# Patient Record
Sex: MALE | Race: BLACK OR AFRICAN AMERICAN | NOT HISPANIC OR LATINO | Employment: FULL TIME | URBAN - METROPOLITAN AREA
[De-identification: names, ages, dates, MRNs, and addresses within clinical notes are randomized per-mention and may not be internally consistent; named-entity substitution may affect disease eponyms.]

---

## 2024-05-10 ENCOUNTER — OFFICE VISIT (OUTPATIENT)
Dept: PRIMARY CARE | Facility: CLINIC | Age: 54
End: 2024-05-10
Payer: COMMERCIAL

## 2024-05-10 VITALS
HEART RATE: 84 BPM | OXYGEN SATURATION: 97 % | HEIGHT: 74 IN | TEMPERATURE: 98.6 F | BODY MASS INDEX: 28.26 KG/M2 | WEIGHT: 220.2 LBS | SYSTOLIC BLOOD PRESSURE: 118 MMHG | DIASTOLIC BLOOD PRESSURE: 80 MMHG

## 2024-05-10 DIAGNOSIS — D69.6 THROMBOCYTOPENIA (CMS-HCC): ICD-10-CM

## 2024-05-10 DIAGNOSIS — Z00.00 ROUTINE HEALTH MAINTENANCE: Primary | ICD-10-CM

## 2024-05-10 DIAGNOSIS — D22.9 ATYPICAL MOLE: ICD-10-CM

## 2024-05-10 DIAGNOSIS — N52.9 ERECTILE DYSFUNCTION, UNSPECIFIED ERECTILE DYSFUNCTION TYPE: ICD-10-CM

## 2024-05-10 DIAGNOSIS — N40.1 BENIGN PROSTATIC HYPERPLASIA WITH WEAK URINARY STREAM: ICD-10-CM

## 2024-05-10 DIAGNOSIS — R39.12 BENIGN PROSTATIC HYPERPLASIA WITH WEAK URINARY STREAM: ICD-10-CM

## 2024-05-10 DIAGNOSIS — D72.819 CHRONIC LEUKOPENIA: ICD-10-CM

## 2024-05-10 DIAGNOSIS — Z12.11 COLON CANCER SCREENING: ICD-10-CM

## 2024-05-10 DIAGNOSIS — R80.9 PROTEINURIA, UNSPECIFIED TYPE: ICD-10-CM

## 2024-05-10 DIAGNOSIS — R53.83 OTHER FATIGUE: ICD-10-CM

## 2024-05-10 DIAGNOSIS — Z11.3 ROUTINE SCREENING FOR STI (SEXUALLY TRANSMITTED INFECTION): ICD-10-CM

## 2024-05-10 DIAGNOSIS — R17 ELEVATED BILIRUBIN: ICD-10-CM

## 2024-05-10 PROCEDURE — 99396 PREV VISIT EST AGE 40-64: CPT | Performed by: INTERNAL MEDICINE

## 2024-05-10 PROCEDURE — 1036F TOBACCO NON-USER: CPT | Performed by: INTERNAL MEDICINE

## 2024-05-10 RX ORDER — SILDENAFIL 100 MG/1
100 TABLET, FILM COATED ORAL DAILY PRN
Qty: 30 TABLET | Refills: 11 | Status: SHIPPED | OUTPATIENT
Start: 2024-05-10

## 2024-05-10 RX ORDER — SILDENAFIL 100 MG/1
100 TABLET, FILM COATED ORAL DAILY PRN
COMMUNITY
End: 2024-05-10 | Stop reason: SDUPTHER

## 2024-05-10 ASSESSMENT — ENCOUNTER SYMPTOMS
OCCASIONAL FEELINGS OF UNSTEADINESS: 0
DEPRESSION: 0
LOSS OF SENSATION IN FEET: 0

## 2024-05-10 NOTE — ASSESSMENT & PLAN NOTE
Mole present on left forearm for many years.  Heterogeneous coloration,normal border. Also with punctate hyperpigmentation on face.  To follow up with derm.

## 2024-05-10 NOTE — PROGRESS NOTES
"Chief Complaint   Patient presents with    [facility]         History Of Present Illness:    Tunde Nix is a 53 y.o. male presenting to establish care, update health maintenance.    Gets palpitations - concerned about heart.  Uncomfortable feeling in chest - usually better when exercising regularly.  Not exertional.  Feeling \"weak\".  Not having chest pain.  Am erections are rigid , not able to maintain rigid erection with partner.  Sildenafil works ,nl TT 501ng/dl in 2023.  (Tadalafil caused back pain)  Would like to update STI screening   Insomnia over time, using OTC melatonin - interrupted sleep, wakes after 4 hours   Energy and motivation have been down.    No change in bowel or bladder function.  Urine stream weaker over time.   Has hx of low WBC and low platelet.  Never met with hematology as recommended in past.  His brother passed from \"blood cancer\"    Asymptomatic but open to STI screening.      Stress Echo 7/11/17  Baseline Echo: The resting baseline ejection fraction was estimated at 60%.     Stress Echo: The ejection fraction is approximately 75% at peak stress.     Summary:   1. The resting ejection fraction was estimated at 60% with a peak exercise ejection fraction estimated at 75%.   2. No clinical, electrocardiographic or echocardiographic evidence for ischemia at a maximal workload.   3. The adequate level of stress was achieved.    Active Medical Problems:  Patient Active Problem List    Diagnosis Date Noted    Elevated bilirubin 05/10/2024    Thrombocytopenia (CMS-HCC) 05/10/2024    Chronic leukopenia 05/10/2024    Proteinuria 05/10/2024    Benign prostatic hyperplasia with weak urinary stream 05/10/2024    Routine health maintenance 05/10/2024    Other fatigue 05/10/2024    Atypical mole 05/10/2024    Erectile dysfunction 05/10/2024       Past Medical History:  Past Medical History:   Diagnosis Date    Chronic leukopenia     Elevated bilirubin     H/O cardiovascular stress test " "07/11/2017    Normal stress echo    Thrombocytopenia (CMS-HCC)        Past Surgical History:  Past Surgical History:   Procedure Laterality Date    WISDOM TOOTH EXTRACTION           Social History:  Social History     Tobacco Use    Smoking status: Never    Smokeless tobacco: Never   Vaping Use    Vaping status: Never Used   Substance Use Topics    Alcohol use: Never    Drug use: Never         Family History:  Family History   Problem Relation Name Age of Onset    Diabetes Mother          BKA    Hypertension Mother      No Known Problems Father      Leukemia Brother          \"blood cancer\"    Diabetes Brother      No Known Problems Daughter          school - [employer]        Allergies:  Tadalafil    Outpatient Medications:    Current Outpatient Medications:     sildenafil (Viagra) 100 mg tablet, Take 1 tablet (100 mg) by mouth once daily as needed for erectile dysfunction., Disp: 30 tablet, Rfl: 11    Review of Systems:  Pertinent positives in review of systems outlined above.  Complete ROS otherwise negative.           Last Recorded Vitals:  Vitals:    05/10/24 0918 05/10/24 1005   BP: 129/82 118/80   BP Location: Left arm    Patient Position: Sitting    BP Cuff Size: Large adult    Pulse: 84    Temp: 37 °C (98.6 °F)    SpO2: 97%    Weight: 99.9 kg (220 lb 3.2 oz)    Height: 1.88 m (6' 2\")    Body mass index is 28.27 kg/m².        Physical Exam  Vitals reviewed.   Constitutional:       Appearance: Normal appearance.   HENT:      Mouth/Throat:      Pharynx: Oropharynx is clear.   Eyes:      Extraocular Movements: Extraocular movements intact.      Conjunctiva/sclera: Conjunctivae normal.      Pupils: Pupils are equal, round, and reactive to light.   Neck:      Vascular: No carotid bruit.   Cardiovascular:      Rate and Rhythm: Normal rate and regular rhythm.   Pulmonary:      Effort: Pulmonary effort is normal.      Breath sounds: Normal breath sounds.   Abdominal:      General: Abdomen is flat. Bowel sounds are " normal.      Palpations: Abdomen is soft. There is no mass.      Tenderness: There is no abdominal tenderness. There is no right CVA tenderness or left CVA tenderness.   Musculoskeletal:      Cervical back: No tenderness.      Right lower leg: No edema.      Left lower leg: No edema.   Lymphadenopathy:      Cervical: No cervical adenopathy.   Neurological:      General: No focal deficit present.      Mental Status: He is alert and oriented to person, place, and time.   Psychiatric:         Mood and Affect: Mood normal.          Assessment/Plan   Problem List Items Addressed This Visit       Elevated bilirubin     Will repeat LFT and check bili frations         Relevant Orders    Comprehensive Metabolic Panel    Thrombocytopenia (CMS-HCC)     Will repeat CBC and check B12, iron studies, retic, SPEP, UPEP, Hep C and HIV and consider referral to hematology if cytopenias have progressed         Relevant Orders    Comprehensive Metabolic Panel    Chronic leukopenia     Repeat CBC, check B12, SPEP,UPEP,HIV,HepC and consider heme referral.         Relevant Orders    Comprehensive Metabolic Panel    Ferritin    Iron and TIBC    Transferrin    Vitamin B12    Reticulocytes    Serum Protein Electrophoresis    Urine Protein Electrophoresis    Proteinuria     Last UA with protein.  Will repeat UA now         Relevant Orders    Comprehensive Metabolic Panel    Urinalysis with Reflex Microscopic    Benign prostatic hyperplasia with weak urinary stream     Symptoms are mild.  PSA due now         Relevant Orders    Prostate Specific Antigen    Routine health maintenance - Primary     BP in a good range, check FBS and lipids.  PSA now,colonoscopy ordered.  Will update STI screen. Vaccines UTD.         Relevant Orders    Lipid Panel    Other fatigue     Will check CBC and TSH         Relevant Orders    TSH with reflex to Free T4 if abnormal    CBC and Auto Differential    Atypical mole     Mole present on left forearm for many years.   Heterogeneous coloration,normal border. Also with punctate hyperpigmentation on face.  To follow up with derm.         Relevant Orders    Referral to Dermatology    Erectile dysfunction     To continue with sildenafil as needed.  To consider daily tadalafil if LUTS worsen         Relevant Medications    sildenafil (Viagra) 100 mg tablet     Other Visit Diagnoses       Routine screening for STI (sexually transmitted infection)        Relevant Orders    C. Trachomatis / N. Gonorrhoeae, Amplified Detection    HIV 1/2 Antigen/Antibody Screen with Reflex to Confirmation    Syphilis Screen with Reflex    Hepatitis C Antibody    Colon cancer screening        Relevant Orders    Colonoscopy Screening; Average Risk Patient              Eduard Castelan MD

## 2024-05-10 NOTE — PATIENT INSTRUCTIONS
Fasting blood work and urinalysis now     Schedule colonoscopy     Sildenafil at [facility] or [facility]    Follow up with dermatology

## 2024-05-10 NOTE — ASSESSMENT & PLAN NOTE
BP in a good range, check FBS and lipids.  PSA now,colonoscopy ordered.  Will update STI screen. Vaccines UTD.

## 2024-05-10 NOTE — ASSESSMENT & PLAN NOTE
Will repeat CBC and check B12, iron studies, retic, SPEP, UPEP, Hep C and HIV and consider referral to hematology if cytopenias have progressed

## 2024-05-24 ENCOUNTER — LAB (OUTPATIENT)
Dept: LAB | Facility: LAB | Age: 54
End: 2024-05-24
Payer: COMMERCIAL

## 2024-05-24 DIAGNOSIS — Z00.00 ROUTINE HEALTH MAINTENANCE: ICD-10-CM

## 2024-05-24 DIAGNOSIS — R39.12 BENIGN PROSTATIC HYPERPLASIA WITH WEAK URINARY STREAM: ICD-10-CM

## 2024-05-24 DIAGNOSIS — R53.83 OTHER FATIGUE: ICD-10-CM

## 2024-05-24 DIAGNOSIS — Z11.3 ROUTINE SCREENING FOR STI (SEXUALLY TRANSMITTED INFECTION): ICD-10-CM

## 2024-05-24 DIAGNOSIS — R80.9 PROTEINURIA, UNSPECIFIED TYPE: ICD-10-CM

## 2024-05-24 DIAGNOSIS — D69.6 THROMBOCYTOPENIA (CMS-HCC): ICD-10-CM

## 2024-05-24 DIAGNOSIS — D72.819 CHRONIC LEUKOPENIA: ICD-10-CM

## 2024-05-24 DIAGNOSIS — R17 ELEVATED BILIRUBIN: ICD-10-CM

## 2024-05-24 DIAGNOSIS — N40.1 BENIGN PROSTATIC HYPERPLASIA WITH WEAK URINARY STREAM: ICD-10-CM

## 2024-05-24 LAB
ALBUMIN SERPL BCP-MCNC: 4.8 G/DL (ref 3.4–5)
ALP SERPL-CCNC: 53 U/L (ref 33–120)
ALT SERPL W P-5'-P-CCNC: 18 U/L (ref 10–52)
ANION GAP SERPL CALC-SCNC: 14 MMOL/L (ref 10–20)
APPEARANCE UR: CLEAR
AST SERPL W P-5'-P-CCNC: 16 U/L (ref 9–39)
BASOPHILS # BLD AUTO: 0.03 X10*3/UL (ref 0–0.1)
BASOPHILS NFR BLD AUTO: 1 %
BILIRUB SERPL-MCNC: 1.1 MG/DL (ref 0–1.2)
BILIRUB UR STRIP.AUTO-MCNC: NEGATIVE MG/DL
BUN SERPL-MCNC: 13 MG/DL (ref 6–23)
CALCIUM SERPL-MCNC: 9.5 MG/DL (ref 8.6–10.3)
CHLORIDE SERPL-SCNC: 103 MMOL/L (ref 98–107)
CHOLEST SERPL-MCNC: 194 MG/DL (ref 0–199)
CHOLESTEROL/HDL RATIO: 4.3
CO2 SERPL-SCNC: 27 MMOL/L (ref 21–32)
COLOR UR: NORMAL
CREAT SERPL-MCNC: 1.35 MG/DL (ref 0.5–1.3)
EGFRCR SERPLBLD CKD-EPI 2021: 63 ML/MIN/1.73M*2
EOSINOPHIL # BLD AUTO: 0.18 X10*3/UL (ref 0–0.7)
EOSINOPHIL NFR BLD AUTO: 5.8 %
ERYTHROCYTE [DISTWIDTH] IN BLOOD BY AUTOMATED COUNT: 12.9 % (ref 11.5–14.5)
FERRITIN SERPL-MCNC: 129 NG/ML (ref 20–300)
GLUCOSE SERPL-MCNC: 103 MG/DL (ref 74–99)
GLUCOSE UR STRIP.AUTO-MCNC: NORMAL MG/DL
HCT VFR BLD AUTO: 44 % (ref 41–52)
HCV AB SER QL: NONREACTIVE
HDLC SERPL-MCNC: 44.8 MG/DL
HGB BLD-MCNC: 15.1 G/DL (ref 13.5–17.5)
HGB RETIC QN: 33 PG (ref 28–38)
HIV 1+2 AB+HIV1 P24 AG SERPL QL IA: NONREACTIVE
IMM GRANULOCYTES # BLD AUTO: 0 X10*3/UL (ref 0–0.7)
IMM GRANULOCYTES NFR BLD AUTO: 0 % (ref 0–0.9)
IMMATURE RETIC FRACTION: 15.7 %
IRON SATN MFR SERPL: 17 % (ref 25–45)
IRON SERPL-MCNC: 60 UG/DL (ref 35–150)
KETONES UR STRIP.AUTO-MCNC: NEGATIVE MG/DL
LDLC SERPL CALC-MCNC: 131 MG/DL
LEUKOCYTE ESTERASE UR QL STRIP.AUTO: NEGATIVE
LYMPHOCYTES # BLD AUTO: 1.16 X10*3/UL (ref 1.2–4.8)
LYMPHOCYTES NFR BLD AUTO: 37.1 %
MCH RBC QN AUTO: 26.7 PG (ref 26–34)
MCHC RBC AUTO-ENTMCNC: 34.3 G/DL (ref 32–36)
MCV RBC AUTO: 78 FL (ref 80–100)
MONOCYTES # BLD AUTO: 0.22 X10*3/UL (ref 0.1–1)
MONOCYTES NFR BLD AUTO: 7 %
NEUTROPHILS # BLD AUTO: 1.54 X10*3/UL (ref 1.2–7.7)
NEUTROPHILS NFR BLD AUTO: 49.1 %
NITRITE UR QL STRIP.AUTO: NEGATIVE
NON HDL CHOLESTEROL: 149 MG/DL (ref 0–149)
NRBC BLD-RTO: 0 /100 WBCS (ref 0–0)
PH UR STRIP.AUTO: 5 [PH]
PLATELET # BLD AUTO: 138 X10*3/UL (ref 150–450)
POTASSIUM SERPL-SCNC: 4.1 MMOL/L (ref 3.5–5.3)
PROT SERPL-MCNC: 7.6 G/DL (ref 6.4–8.2)
PROT SERPL-MCNC: 8 G/DL (ref 6.4–8.2)
PROT UR STRIP.AUTO-MCNC: NORMAL MG/DL
PROT UR-ACNC: 26 MG/DL (ref 5–25)
PSA SERPL-MCNC: 0.7 NG/ML
RBC # BLD AUTO: 5.65 X10*6/UL (ref 4.5–5.9)
RBC # UR STRIP.AUTO: NEGATIVE /UL
RBC #/AREA URNS AUTO: NORMAL /HPF
RETICS #: 0.08 X10*6/UL (ref 0.02–0.12)
RETICS/RBC NFR AUTO: 1.4 % (ref 0.5–2)
SODIUM SERPL-SCNC: 140 MMOL/L (ref 136–145)
SP GR UR STRIP.AUTO: 1.02
TIBC SERPL-MCNC: 353 UG/DL (ref 240–445)
TRANSFERRIN SERPL-MCNC: 265 MG/DL (ref 200–360)
TREPONEMA PALLIDUM IGG+IGM AB [PRESENCE] IN SERUM OR PLASMA BY IMMUNOASSAY: NONREACTIVE
TRIGL SERPL-MCNC: 91 MG/DL (ref 0–149)
TSH SERPL-ACNC: 1.49 MIU/L (ref 0.44–3.98)
UIBC SERPL-MCNC: 293 UG/DL (ref 110–370)
UROBILINOGEN UR STRIP.AUTO-MCNC: NORMAL MG/DL
VIT B12 SERPL-MCNC: 354 PG/ML (ref 211–911)
VLDL: 18 MG/DL (ref 0–40)
WBC # BLD AUTO: 3.1 X10*3/UL (ref 4.4–11.3)
WBC #/AREA URNS AUTO: NORMAL /HPF

## 2024-05-24 PROCEDURE — 86780 TREPONEMA PALLIDUM: CPT

## 2024-05-24 PROCEDURE — 80053 COMPREHEN METABOLIC PANEL: CPT

## 2024-05-24 PROCEDURE — 84156 ASSAY OF PROTEIN URINE: CPT

## 2024-05-24 PROCEDURE — 84443 ASSAY THYROID STIM HORMONE: CPT

## 2024-05-24 PROCEDURE — 87389 HIV-1 AG W/HIV-1&-2 AB AG IA: CPT

## 2024-05-24 PROCEDURE — 84166 PROTEIN E-PHORESIS/URINE/CSF: CPT

## 2024-05-24 PROCEDURE — 82607 VITAMIN B-12: CPT

## 2024-05-24 PROCEDURE — 36415 COLL VENOUS BLD VENIPUNCTURE: CPT

## 2024-05-24 PROCEDURE — 84165 PROTEIN E-PHORESIS SERUM: CPT

## 2024-05-24 PROCEDURE — 84153 ASSAY OF PSA TOTAL: CPT

## 2024-05-24 PROCEDURE — 84466 ASSAY OF TRANSFERRIN: CPT

## 2024-05-24 PROCEDURE — 85025 COMPLETE CBC W/AUTO DIFF WBC: CPT

## 2024-05-24 PROCEDURE — 82728 ASSAY OF FERRITIN: CPT

## 2024-05-24 PROCEDURE — 83550 IRON BINDING TEST: CPT

## 2024-05-24 PROCEDURE — 84155 ASSAY OF PROTEIN SERUM: CPT

## 2024-05-24 PROCEDURE — 81001 URINALYSIS AUTO W/SCOPE: CPT

## 2024-05-24 PROCEDURE — 83540 ASSAY OF IRON: CPT

## 2024-05-24 PROCEDURE — 86803 HEPATITIS C AB TEST: CPT

## 2024-05-24 PROCEDURE — 80061 LIPID PANEL: CPT

## 2024-05-24 PROCEDURE — 85045 AUTOMATED RETICULOCYTE COUNT: CPT

## 2024-05-24 PROCEDURE — 87591 N.GONORRHOEAE DNA AMP PROB: CPT

## 2024-05-24 PROCEDURE — 87491 CHLMYD TRACH DNA AMP PROBE: CPT

## 2024-05-25 DIAGNOSIS — D75.9 CYTOPENIA: Primary | ICD-10-CM

## 2024-05-25 LAB
C TRACH RRNA SPEC QL NAA+PROBE: NEGATIVE
N GONORRHOEA DNA SPEC QL PROBE+SIG AMP: NEGATIVE

## 2024-05-28 LAB
ALBUMIN MFR UR ELPH: 80.4 %
ALPHA1 GLOB MFR UR ELPH: 2.4 %
ALPHA2 GLOB MFR UR ELPH: 5.6 %
B-GLOBULIN MFR UR ELPH: 7.9 %
GAMMA GLOB MFR UR ELPH: 3.7 %
PATH REVIEW-URINE PROTEIN ELECTROPHORESIS: NORMAL
URINE ELECTROPHORESIS COMMENT: NORMAL

## 2024-05-29 LAB
ALBUMIN: 5.1 G/DL (ref 3.4–5)
ALPHA 1 GLOBULIN: 0.3 G/DL (ref 0.2–0.6)
ALPHA 2 GLOBULIN: 0.5 G/DL (ref 0.4–1.1)
BETA GLOBULIN: 0.9 G/DL (ref 0.5–1.2)
GAMMA GLOBULIN: 1.2 G/DL (ref 0.5–1.4)
PATH REVIEW-SERUM PROTEIN ELECTROPHORESIS: ABNORMAL
PROTEIN ELECTROPHORESIS COMMENT: ABNORMAL

## 2024-06-24 PROBLEM — G47.00 INSOMNIA: Status: ACTIVE | Noted: 2024-06-24

## 2024-06-24 PROBLEM — N48.89 PEARLY PENILE PAPULES: Status: ACTIVE | Noted: 2024-06-24

## 2024-06-24 PROBLEM — K62.89 RECTAL PAIN: Status: ACTIVE | Noted: 2024-06-24

## 2024-06-24 PROBLEM — R00.2 HEART PALPITATIONS: Status: ACTIVE | Noted: 2024-06-24

## 2024-06-24 PROBLEM — Z77.098 EXPOSURE TO CHEMICAL INHALATION: Status: ACTIVE | Noted: 2024-06-24

## 2024-06-24 PROBLEM — E78.5 HYPERLIPIDEMIA: Status: ACTIVE | Noted: 2024-06-24

## 2024-06-24 PROBLEM — R05.9 COUGH: Status: ACTIVE | Noted: 2024-06-24

## 2024-06-24 PROBLEM — R10.9 ABDOMINAL PAIN: Status: ACTIVE | Noted: 2024-06-24

## 2024-06-24 PROBLEM — N46.9 MALE INFERTILITY: Status: ACTIVE | Noted: 2024-06-24

## 2024-06-24 PROBLEM — F52.4 PREMATURE EJACULATION: Status: ACTIVE | Noted: 2024-06-24

## 2024-06-24 PROBLEM — B37.42 CANDIDAL BALANITIS: Status: ACTIVE | Noted: 2024-06-24

## 2024-06-24 PROBLEM — N28.9 ACUTE RENAL INSUFFICIENCY: Status: ACTIVE | Noted: 2024-06-24

## 2024-06-24 PROBLEM — F52.0 DECREASED SEXUAL DESIRE: Status: ACTIVE | Noted: 2024-06-24

## 2024-06-24 PROBLEM — R07.89 ATYPICAL CHEST PAIN: Status: ACTIVE | Noted: 2024-06-24

## 2024-06-24 PROBLEM — K21.9 ESOPHAGEAL REFLUX: Status: ACTIVE | Noted: 2024-06-24

## 2024-06-24 RX ORDER — ERGOCALCIFEROL 1.25 MG/1
1.25 CAPSULE ORAL
COMMUNITY
Start: 2016-03-29

## 2024-06-24 RX ORDER — FLUOCINOLONE ACETONIDE 0.11 MG/ML
5 OIL AURICULAR (OTIC) 2 TIMES DAILY
COMMUNITY
Start: 2017-06-13

## 2024-06-24 RX ORDER — PAROXETINE HYDROCHLORIDE 20 MG/1
1 TABLET, FILM COATED ORAL DAILY
COMMUNITY
Start: 2016-05-04

## 2024-06-24 RX ORDER — TRIAMCINOLONE ACETONIDE 1 MG/G
1 CREAM TOPICAL 2 TIMES DAILY
COMMUNITY
Start: 2017-06-13

## 2024-06-24 RX ORDER — TRAZODONE HYDROCHLORIDE 50 MG/1
50-100 TABLET ORAL NIGHTLY PRN
COMMUNITY
Start: 2023-09-20

## 2024-06-24 RX ORDER — ROSUVASTATIN CALCIUM 5 MG/1
1 TABLET, COATED ORAL DAILY
COMMUNITY
Start: 2020-11-12

## 2024-07-01 ENCOUNTER — LAB (OUTPATIENT)
Dept: LAB | Facility: CLINIC | Age: 54
End: 2024-07-01
Payer: COMMERCIAL

## 2024-07-01 ENCOUNTER — OFFICE VISIT (OUTPATIENT)
Dept: HEMATOLOGY/ONCOLOGY | Facility: CLINIC | Age: 54
End: 2024-07-01
Payer: COMMERCIAL

## 2024-07-01 VITALS
SYSTOLIC BLOOD PRESSURE: 130 MMHG | TEMPERATURE: 98.2 F | DIASTOLIC BLOOD PRESSURE: 83 MMHG | RESPIRATION RATE: 16 BRPM | HEART RATE: 92 BPM | WEIGHT: 218.03 LBS | BODY MASS INDEX: 27.99 KG/M2 | OXYGEN SATURATION: 98 %

## 2024-07-01 DIAGNOSIS — D72.819 LEUKOPENIA, UNSPECIFIED TYPE: Primary | ICD-10-CM

## 2024-07-01 DIAGNOSIS — D72.810 LYMPHOPENIA: ICD-10-CM

## 2024-07-01 DIAGNOSIS — D72.819 LEUKOPENIA, UNSPECIFIED TYPE: ICD-10-CM

## 2024-07-01 DIAGNOSIS — D69.6 THROMBOCYTOPENIA (CMS-HCC): ICD-10-CM

## 2024-07-01 LAB
APTT PPP: 29 SECONDS (ref 27–38)
BASOPHILS # BLD AUTO: 0.01 X10*3/UL (ref 0–0.1)
BASOPHILS NFR BLD AUTO: 0.4 %
CMV IGG AVIDITY SERPL IA-RTO: REACTIVE %
CRP SERPL-MCNC: <0.1 MG/DL
EBV EA IGG SER QL: NEGATIVE
EBV NA AB SER QL: POSITIVE
EBV VCA IGG SER IA-ACNC: POSITIVE
EBV VCA IGM SER IA-ACNC: NEGATIVE
EOSINOPHIL # BLD AUTO: 0.06 X10*3/UL (ref 0–0.7)
EOSINOPHIL NFR BLD AUTO: 2.2 %
ERYTHROCYTE [DISTWIDTH] IN BLOOD BY AUTOMATED COUNT: 12.8 % (ref 11.5–14.5)
ERYTHROCYTE [SEDIMENTATION RATE] IN BLOOD BY WESTERGREN METHOD: 7 MM/H (ref 0–20)
FOLATE SERPL-MCNC: 16.7 NG/ML
HBV CORE IGM SER QL: NONREACTIVE
HBV SURFACE AG SERPL QL IA: NONREACTIVE
HCT VFR BLD AUTO: 43.7 % (ref 41–52)
HGB BLD-MCNC: 14.9 G/DL (ref 13.5–17.5)
IGA SERPL-MCNC: 183 MG/DL (ref 70–400)
IGG SERPL-MCNC: 1420 MG/DL (ref 700–1600)
IGM SERPL-MCNC: 69 MG/DL (ref 40–230)
IMM GRANULOCYTES # BLD AUTO: 0.01 X10*3/UL (ref 0–0.7)
IMM GRANULOCYTES NFR BLD AUTO: 0.4 % (ref 0–0.9)
INR PPP: 1 (ref 0.9–1.1)
LDH SERPL L TO P-CCNC: 135 U/L (ref 84–246)
LYMPHOCYTES # BLD AUTO: 0.91 X10*3/UL (ref 1.2–4.8)
LYMPHOCYTES NFR BLD AUTO: 33.1 %
MCH RBC QN AUTO: 26.6 PG (ref 26–34)
MCHC RBC AUTO-ENTMCNC: 34.1 G/DL (ref 32–36)
MCV RBC AUTO: 78 FL (ref 80–100)
MONOCYTES # BLD AUTO: 0.21 X10*3/UL (ref 0.1–1)
MONOCYTES NFR BLD AUTO: 7.6 %
NEUTROPHILS # BLD AUTO: 1.55 X10*3/UL (ref 1.2–7.7)
NEUTROPHILS NFR BLD AUTO: 56.3 %
NRBC BLD-RTO: ABNORMAL /100{WBCS}
PLATELET # BLD AUTO: 135 X10*3/UL (ref 150–450)
PROTHROMBIN TIME: 11.5 SECONDS (ref 9.8–12.8)
RBC # BLD AUTO: 5.61 X10*6/UL (ref 4.5–5.9)
RHEUMATOID FACT SER NEPH-ACNC: <10 IU/ML (ref 0–15)
TSH SERPL-ACNC: 1.56 MIU/L (ref 0.44–3.98)
WBC # BLD AUTO: 2.8 X10*3/UL (ref 4.4–11.3)

## 2024-07-01 PROCEDURE — 86431 RHEUMATOID FACTOR QUANT: CPT

## 2024-07-01 PROCEDURE — 82784 ASSAY IGA/IGD/IGG/IGM EACH: CPT

## 2024-07-01 PROCEDURE — 87340 HEPATITIS B SURFACE AG IA: CPT

## 2024-07-01 PROCEDURE — 81450 HL NEO GSAP 5-50DNA/DNA&RNA: CPT

## 2024-07-01 PROCEDURE — 36415 COLL VENOUS BLD VENIPUNCTURE: CPT

## 2024-07-01 PROCEDURE — 86665 EPSTEIN-BARR CAPSID VCA: CPT

## 2024-07-01 PROCEDURE — 84443 ASSAY THYROID STIM HORMONE: CPT

## 2024-07-01 PROCEDURE — 86140 C-REACTIVE PROTEIN: CPT

## 2024-07-01 PROCEDURE — 1036F TOBACCO NON-USER: CPT | Performed by: PHYSICIAN ASSISTANT

## 2024-07-01 PROCEDURE — 85730 THROMBOPLASTIN TIME PARTIAL: CPT

## 2024-07-01 PROCEDURE — 86023 IMMUNOGLOBULIN ASSAY: CPT

## 2024-07-01 PROCEDURE — 86664 EPSTEIN-BARR NUCLEAR ANTIGEN: CPT

## 2024-07-01 PROCEDURE — 84630 ASSAY OF ZINC: CPT

## 2024-07-01 PROCEDURE — 86645 CMV ANTIBODY IGM: CPT

## 2024-07-01 PROCEDURE — 86644 CMV ANTIBODY: CPT

## 2024-07-01 PROCEDURE — 83615 LACTATE (LD) (LDH) ENZYME: CPT

## 2024-07-01 PROCEDURE — 99214 OFFICE O/P EST MOD 30 MIN: CPT | Performed by: PHYSICIAN ASSISTANT

## 2024-07-01 PROCEDURE — 85652 RBC SED RATE AUTOMATED: CPT

## 2024-07-01 PROCEDURE — 86038 ANTINUCLEAR ANTIBODIES: CPT

## 2024-07-01 PROCEDURE — 85610 PROTHROMBIN TIME: CPT

## 2024-07-01 PROCEDURE — 86663 EPSTEIN-BARR ANTIBODY: CPT

## 2024-07-01 PROCEDURE — 88184 FLOWCYTOMETRY/ TC 1 MARKER: CPT | Mod: TC | Performed by: PHYSICIAN ASSISTANT

## 2024-07-01 PROCEDURE — 82525 ASSAY OF COPPER: CPT

## 2024-07-01 PROCEDURE — 99204 OFFICE O/P NEW MOD 45 MIN: CPT | Performed by: PHYSICIAN ASSISTANT

## 2024-07-01 PROCEDURE — 86705 HEP B CORE ANTIBODY IGM: CPT

## 2024-07-01 PROCEDURE — 85025 COMPLETE CBC W/AUTO DIFF WBC: CPT

## 2024-07-01 PROCEDURE — G0452 MOLECULAR PATHOLOGY INTERPR: HCPCS | Performed by: PATHOLOGY

## 2024-07-01 PROCEDURE — 82746 ASSAY OF FOLIC ACID SERUM: CPT

## 2024-07-01 ASSESSMENT — PAIN SCALES - GENERAL: PAINLEVEL: 4

## 2024-07-01 NOTE — PROGRESS NOTES
Patient ID: Tunde Nix is a 53 y.o. male.  Referring Physician: Eduard Castelan MD  [address] [place], [facility] [address]  [place] [address]  Primary Care Provider: Eduard Castelan MD  Visit Type: Initial Visit    Location: [facility]  Diagnosis/Reason:   Leukopenia  Thrombocytopenia      HPI:  Tunde Nix is a 53 y.o. male referred for consultation of leukopenia & thrombocytopenia.     Per review of records:  Leukocytopenia since 2018 ranging from 2.5 - 3.9  Lymphocytes low since 2018 (0.85-1.16)  Thrombocytopenia since 2018 ranging from 127 - 145  No anemia - MCV 78    Previous Hematological Background:  Hx of hematological disorders: No - Patient denies prior hematologic history  Hx of blood transfusions: No - Patient denies prior blood transfusion  Hx of iron supplementation: Yes - Oral supplementation - Denies adverse effect and reaction - Agent: in the past   Hx of B12 supplementation: No - Denies any prior supplementation  Hx of folate supplementation: No - Denies any prior supplementation    Working almost everyday so some tiredness, low feeling, returns to normal after rest. Headaches, chronic since childhood, takes Excedrin. Headaches when long periods without eating. Heartburn - takes TUMS. Low back pain with activities, radiates to butt. Rare palpitations. Itching outer ear - on drops.     Patient denies weight loss, abnormal bruising and bleeding, hematuria, blood in stool, dark/black stools, epistaxis, oral/gingival bleeding, lymphadenopathy, recurrent infections, recurrent fevers, night sweats, early satiety, abdominal pain, bone pain, chest pain, SOB, HORTON, dizziness, lightheadedness, PICA.    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    Male infertility 06/24/2024    Insomnia 06/24/2024    Hyperlipidemia 06/24/2024    Heart palpitations 06/24/2024    Exposure to chemical inhalation 06/24/2024    Esophageal reflux 06/24/2024    Decreased sexual desire 06/24/2024    Cough 06/24/2024    Candidal balanitis 06/24/2024    Atypical chest pain 06/24/2024    Acute renal insufficiency 06/24/2024    Abdominal pain 06/24/2024    Rectal pain 06/24/2024     Resolved Ambulatory Problems     Diagnosis Date Noted    No Resolved Ambulatory Problems     Past Medical History:   Diagnosis Date    H/O cardiovascular stress test 07/11/2017      PSHx:  Past Surgical History:   Procedure Laterality Date    WISDOM TOOTH EXTRACTION       FHx:  Family History   Problem Relation Name Age of Onset    Diabetes Mother          BKA    Hypertension Mother      No Known Problems Father      Leukemia Brother          \"blood cancer\"    Diabetes Brother      No Known Problems Daughter          school - [employer]      Mother: DM, HTN  Father: unknown   Siblings: Brother - unknown cancer   Children: 1 daughter healthy    Social Hx:  Tunde Nix    reports that he has never smoked. He has never used smokeless tobacco.  He  reports no history of alcohol use.  He  reports no history of drug use.  Social History     Socioeconomic History    Marital status:      Spouse name: None    Number of children: None    Years of education: None    Highest education level: None   Occupational History    Occupation: [employer]   Tobacco Use    Smoking status: Never    Smokeless tobacco: Never   Vaping Use    Vaping status: Never Used   Substance and Sexual Activity    Alcohol use: Never    Drug use: Never    Sexual activity: Yes   Other Topics Concern    None   Social History Narrative    Has gym membership, less " over past 6 months      Social Determinants of Health     Financial Resource Strain: Not on file   Food Insecurity: Not on file   Transportation Needs: Not on file   Physical Activity: Not on file   Stress: Not on file   Social Connections: Not on file   Intimate Partner Violence: Not on file   Housing Stability: Not on file      Living Situation: Alone   Occupation: [employer]  Marital Status: single  Alcohol Use: denies  Smoking: never smoker  Recreational Drug Use: denies  Special Diets: regular diet  Ethnicity: black - [place] - here for 15 years    Cancer Screenings:  Upper EGD: denies  Colonoscopy: Planned    Prostate/PSA screenings: 0.70 5/24/24   Lung cancer screenings: denies    Medications and allergies reviewed in EMR.    ROS:  10 point review of systems negative except as state in HPI.    Vitals & Statistics:  Objective   BSA: 2.27 meters squared  /83 (BP Location: Left arm, Patient Position: Sitting, BP Cuff Size: Large adult)   Pulse 92   Temp 36.8 °C (98.2 °F) (Core)   Resp 16   Wt 98.9 kg (218 lb 0.6 oz)   SpO2 98%   BMI 27.99 kg/m²     Physical Exam:  Physical Exam  Constitutional:       Appearance: Normal appearance. He is normal weight.   HENT:      Head: Normocephalic and atraumatic.      Nose: Nose normal.      Mouth/Throat:      Mouth: Mucous membranes are moist.      Pharynx: Oropharynx is clear.   Eyes:      Pupils: Pupils are equal, round, and reactive to light.   Cardiovascular:      Rate and Rhythm: Normal rate and regular rhythm.      Pulses: Normal pulses.      Heart sounds: Normal heart sounds.   Pulmonary:      Effort: Pulmonary effort is normal. No respiratory distress.      Breath sounds: Normal breath sounds.   Abdominal:      General: Abdomen is flat. Bowel sounds are normal. There is no distension.      Palpations: There is no mass.      Tenderness: There is no abdominal tenderness. There is no guarding.   Musculoskeletal:         General: Normal range of motion.       "Cervical back: Normal range of motion and neck supple.   Lymphadenopathy:      Cervical: No cervical adenopathy.   Skin:     General: Skin is warm and dry.      Capillary Refill: Capillary refill takes less than 2 seconds.      Comments: Chest and back pigment changes    Neurological:      General: No focal deficit present.      Mental Status: He is alert. Mental status is at baseline.   Psychiatric:         Mood and Affect: Mood normal.         Behavior: Behavior normal.         Thought Content: Thought content normal.         Judgment: Judgment normal.         Results:  Lab Results   Component Value Date    WBC 3.1 (L) 05/24/2024    NEUTROABS 1.54 05/24/2024    IGABSOL 0.00 05/24/2024    LYMPHSABS 1.16 (L) 05/24/2024    MONOSABS 0.22 05/24/2024    EOSABS 0.18 05/24/2024    BASOSABS 0.03 05/24/2024    RBC 5.65 05/24/2024    MCV 78 (L) 05/24/2024    MCHC 34.3 05/24/2024    HGB 15.1 05/24/2024    HCT 44.0 05/24/2024     (L) 05/24/2024     Lab Results   Component Value Date    RETICCTPCT 1.4 05/24/2024      Lab Results   Component Value Date    CREATININE 1.35 (H) 05/24/2024    BUN 13 05/24/2024    EGFR 63 05/24/2024     05/24/2024    K 4.1 05/24/2024     05/24/2024    CO2 27 05/24/2024      Lab Results   Component Value Date    ALT 18 05/24/2024    AST 16 05/24/2024    ALKPHOS 53 05/24/2024    BILITOT 1.1 05/24/2024      Lab Results   Component Value Date    TSH 1.49 05/24/2024     Lab Results   Component Value Date    TSH 1.49 05/24/2024     Lab Results   Component Value Date    IRON 60 05/24/2024    TIBC 353 05/24/2024    FERRITIN 129 05/24/2024      Lab Results   Component Value Date    ZOIBAMIT31 354 05/24/2024      No results found for: \"FOLATE\"  No results found for: \"ZULEMA\", \"RF\", \"SEDRATE\"   No results found for: \"CRP\"   No results found for: \"RUI\"  No results found for: \"LDH\"  No results found for: \"HAPTOGLOBIN\"  Lab Results   Component Value Date    SPEP Normal.    05/24/2024     No results " "found for: \"IGG\", \"IGM\", \"IGA\"  Lab Results   Component Value Date    HEPCAB Nonreactive 2024     Lab Results   Component Value Date    HIV1X2 Nonreactive 2024       Assessment:  Tunde Nix is a 53 y.o. male referred for consultation of leukopenia and thrombocytopenia.    Working almost everyday so some tiredness, low feeling, returns to normal after rest. Headaches, chronic since childhood, takes Excedrin. Headaches when long periods without eating. Heartburn - takes TUMS. Low back pain with activities, radiates to butt. Rare palpitations. Itching outer ear - on drops.     Physical exam unremarkable    I reviewed patient's chart including but not limited to labs, imaging, surgical/procedure notes, pathology, hospital notes, doctor's notes.    Relevant historical labs/imagin24  Creatinine: 1.35  Vit B12: 354  Iron studies: Normal ferritin, iron and TIBC, %Sat low at 17  TSH: WNL  SPEP: Normal   Urine Electrophoresis: No M protein, mild proteinuria   Hep C: Non-reactive   HIV: Negative    24 results:  Leukopenia at 2.8 - Lymphopenia at 0.91  MC/NC erythrocytes that are normal in count - H&H WNL - RDW WNL  Thrombocytopenia at 135K  Remaining results pending at time of writing    Plan:  Discussed possible etiologies of thrombocytopenia including: vitamin deficiency, enlarged spleen/liver, autoimmune disease, infection, inflammatory disorder, allergies, collection tube clumping, etc.   Discussed seeking medical attention if became sick d/t low wbc counts. To call us sooner for appt if starts getting B symptoms, recurrent infections or lumps.  Discussed possible etiologies of leukopenia including: vitamin deficiency, autoimmune disease, infection, inflammatory disorder, allergies, chronic benign leukopenia, malignancy, etc.   Will start hematological work-up today.    1) Leukopenia  2) Thrombocytopenia  Lab results pending - Will review when available and address adverse results as needed  RTC " in 2-4 weeks via in-person visit - F/U sooner if needed/urgent    I had an extensive discussion with the patient regarding the diagnosis and discussed the plan of therapy, including general considerations regarding side effects and outcomes. Pt understood and gave appropriate teach back about the plan of care. All questions were answered to the patient's satisfaction. The patient is instructed to contact us at any time if questions or problems arise. Thank you for the opportunity to participate in the care of this very pleasant patient.    Total time = 80 minutes. 50% or more of this time was spent in counseling and/or coordination of care including reviewing medical history/radiology/labs, examining patient, formulating outlined plan with team, and discussing plan with patient/family.    Sanju Raines PA-C

## 2024-07-02 LAB
CELL POPULATIONS: NORMAL
CMV IGM SERPL-ACNC: 10 AU/ML
DIAGNOSIS: NORMAL
FLOW DIFFERENTIAL: NORMAL
FLOW TEST ORDERED: NORMAL
LAB TEST METHOD: NORMAL
NUMBER OF CELLS COLLECTED: NORMAL PER TUBE
PATH REPORT.TOTAL CANCER: NORMAL
SIGNATURE COMMENT: NORMAL
SPECIMEN VIABILITY: NORMAL

## 2024-07-03 LAB
ANA SER QL HEP2 SUBST: NEGATIVE
COPPER SERPL-MCNC: 100.9 UG/DL (ref 70–140)
PLATELET ANTIBODY TARGET 1 IGG RESULT: NEGATIVE
PLATELET ANTIBODY TARGET 1 IGM RESULT: NEGATIVE
PLATELET ANTIBODY TARGET 2 IGG RESULT: NEGATIVE
PLATELET ANTIBODY TARGET 2 IGM RESULT: NEGATIVE
SCAN RESULT: NORMAL
ZINC SERPL-MCNC: 76.6 UG/DL (ref 60–120)

## 2024-07-09 LAB
ELECTRONICALLY SIGNED BY: NORMAL
MYELOID NGS RESULTS: NORMAL

## 2024-07-10 LAB — SCAN RESULT: NORMAL

## 2024-07-22 ENCOUNTER — OFFICE VISIT (OUTPATIENT)
Dept: HEMATOLOGY/ONCOLOGY | Facility: CLINIC | Age: 54
End: 2024-07-22
Payer: COMMERCIAL

## 2024-07-22 VITALS
TEMPERATURE: 97.5 F | SYSTOLIC BLOOD PRESSURE: 118 MMHG | HEART RATE: 103 BPM | WEIGHT: 218.7 LBS | DIASTOLIC BLOOD PRESSURE: 82 MMHG | BODY MASS INDEX: 28.08 KG/M2 | OXYGEN SATURATION: 97 % | RESPIRATION RATE: 18 BRPM

## 2024-07-22 DIAGNOSIS — D72.819 CHRONIC LEUKOPENIA: ICD-10-CM

## 2024-07-22 DIAGNOSIS — D69.3 IDIOPATHIC THROMBOCYTOPENIC PURPURA (MULTI): Primary | ICD-10-CM

## 2024-07-22 PROCEDURE — 99214 OFFICE O/P EST MOD 30 MIN: CPT | Performed by: PHYSICIAN ASSISTANT

## 2024-07-22 ASSESSMENT — PAIN SCALES - GENERAL: PAINLEVEL: 0-NO PAIN

## 2024-07-22 NOTE — PROGRESS NOTES
Patient ID: Tunde Nix is a 53 y.o. male.  Referring Physician: No referring provider defined for this encounter.  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    [facility] Hx:  7/1/24 - Initial Visit  Tunde Nix is a 53 y.o. male referred for consultation of leukopenia & thrombocytopenia.     Per review of records:  Leukocytopenia since 2018 ranging from 2.5 - 3.9  Lymphocytes low since 2018 (0.85-1.16)  Thrombocytopenia since 2018 ranging from 127 - 145  No anemia - MCV 78    Previous Hematological Background:  Hx of hematological disorders: No - Patient denies prior hematologic history  Hx of blood transfusions: No - Patient denies prior blood transfusion  Hx of iron supplementation: Yes - Oral supplementation - Denies adverse effect and reaction - Agent: in the past   Hx of B12 supplementation: No - Denies any prior supplementation  Hx of folate supplementation: No - Denies any prior supplementation    Working almost everyday so some tiredness, low feeling, returns to normal after rest. Headaches, chronic since childhood, takes Excedrin. Headaches when long periods without eating. Heartburn - takes TUMS. Low back pain with activities, radiates to butt. Rare palpitations. Itching outer ear - on drops.     Patient denies weight loss, abnormal bruising and  "bleeding, hematuria, blood in stool, dark/black stools, epistaxis, oral/gingival bleeding, lymphadenopathy, recurrent infections, recurrent fevers, night sweats, early satiety, abdominal pain, bone pain, chest pain, SOB, HORTON, dizziness, lightheadedness, PICA.    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    Male infertility 06/24/2024    Insomnia 06/24/2024    Hyperlipidemia 06/24/2024    Heart palpitations 06/24/2024    Exposure to chemical inhalation 06/24/2024    Esophageal reflux 06/24/2024    Decreased sexual desire 06/24/2024    Cough 06/24/2024    Candidal balanitis 06/24/2024    Atypical chest pain 06/24/2024    Acute renal insufficiency 06/24/2024    Abdominal pain 06/24/2024    Rectal pain 06/24/2024     Resolved Ambulatory Problems     Diagnosis Date Noted    No Resolved Ambulatory Problems     Past Medical History:   Diagnosis Date    H/O cardiovascular stress test 07/11/2017      PSHx:  Past Surgical History:   Procedure Laterality Date    WISDOM TOOTH EXTRACTION       FHx:  Family History   Problem Relation Name Age of Onset    Diabetes Mother          BKA    Hypertension Mother      No Known Problems Father      Leukemia Brother          \"blood cancer\"    Diabetes Brother      No Known Problems Daughter          school - [place]      Mother: DM, HTN  Father: unknown   Siblings: Brother - unknown cancer   Children: 1 daughter healthy    Social Hx:  Tunde Nix    reports that he has never smoked. He has never used smokeless tobacco.  He  reports no history of alcohol use.  He  reports no " history of drug use.  Social History     Socioeconomic History    Marital status:    Occupational History    Occupation: [employer]   Tobacco Use    Smoking status: Never    Smokeless tobacco: Never   Vaping Use    Vaping status: Never Used   Substance and Sexual Activity    Alcohol use: Never    Drug use: Never    Sexual activity: Yes   Social History Narrative    Has gym membership, less over past 6 months       Living Situation: Alone   Occupation: [employer]  Marital Status: single  Alcohol Use: denies  Smoking: never smoker  Recreational Drug Use: denies  Special Diets: regular diet  Ethnicity: black - Filipino - here for 15 years    Cancer Screenings:  Upper EGD: denies  Colonoscopy: Planned    Prostate/PSA screenings: 0.70 5/24/24   Lung cancer screenings: denies    Medications and allergies reviewed in EMR.    ROS:  10 point review of systems negative except as state in HPI.    Vitals & Statistics:  Objective   BSA: There is no height or weight on file to calculate BSA.  There were no vitals taken for this visit.    Physical Exam:  Physical Exam  Constitutional:       Appearance: Normal appearance. He is normal weight.   HENT:      Head: Normocephalic and atraumatic.      Nose: Nose normal.      Mouth/Throat:      Mouth: Mucous membranes are moist.      Pharynx: Oropharynx is clear.   Eyes:      Pupils: Pupils are equal, round, and reactive to light.   Cardiovascular:      Rate and Rhythm: Normal rate and regular rhythm.      Pulses: Normal pulses.      Heart sounds: Normal heart sounds.   Pulmonary:      Effort: Pulmonary effort is normal. No respiratory distress.      Breath sounds: Normal breath sounds.   Abdominal:      General: Abdomen is flat. Bowel sounds are normal. There is no distension.      Palpations: There is no mass.      Tenderness: There is no abdominal tenderness. There is no guarding.   Musculoskeletal:         General: Normal range of motion.      Cervical back: Normal range of motion and  "neck supple.   Lymphadenopathy:      Cervical: No cervical adenopathy.   Skin:     General: Skin is warm and dry.      Capillary Refill: Capillary refill takes less than 2 seconds.      Comments: Chest and back pigment changes    Neurological:      General: No focal deficit present.      Mental Status: He is alert. Mental status is at baseline.   Psychiatric:         Mood and Affect: Mood normal.         Behavior: Behavior normal.         Thought Content: Thought content normal.         Judgment: Judgment normal.         Results:  Lab Results   Component Value Date    WBC 2.8 (L) 07/01/2024    NEUTROABS 1.55 07/01/2024    IGABSOL 0.01 07/01/2024    LYMPHSABS 0.91 (L) 07/01/2024    MONOSABS 0.21 07/01/2024    EOSABS 0.06 07/01/2024    BASOSABS 0.01 07/01/2024    RBC 5.61 07/01/2024    MCV 78 (L) 07/01/2024    MCHC 34.1 07/01/2024    HGB 14.9 07/01/2024    HCT 43.7 07/01/2024     (L) 07/01/2024     Lab Results   Component Value Date    RETICCTPCT 1.4 05/24/2024      Lab Results   Component Value Date    CREATININE 1.35 (H) 05/24/2024    BUN 13 05/24/2024    EGFR 63 05/24/2024     05/24/2024    K 4.1 05/24/2024     05/24/2024    CO2 27 05/24/2024      Lab Results   Component Value Date    ALT 18 05/24/2024    AST 16 05/24/2024    ALKPHOS 53 05/24/2024    BILITOT 1.1 05/24/2024      Lab Results   Component Value Date    TSH 1.56 07/01/2024     Lab Results   Component Value Date    TSH 1.56 07/01/2024     Lab Results   Component Value Date    IRON 60 05/24/2024    TIBC 353 05/24/2024    FERRITIN 129 05/24/2024      Lab Results   Component Value Date    CWSNAYPZ51 354 05/24/2024      Lab Results   Component Value Date    FOLATE 16.7 07/01/2024     Lab Results   Component Value Date    ZULEMA Negative 07/01/2024    RF <10 07/01/2024    SEDRATE 7 07/01/2024      Lab Results   Component Value Date    CRP <0.10 07/01/2024      No results found for: \"RUI\"  Lab Results   Component Value Date     " "2024     No results found for: \"HAPTOGLOBIN\"  Lab Results   Component Value Date    SPEP Normal.    2024     Lab Results   Component Value Date    IGG 1,420 2024    IGM 69 2024     2024     Lab Results   Component Value Date    HEPBCIGM Nonreactive 2024    HEPBSAG Nonreactive 2024    HEPCAB Nonreactive 2024     Lab Results   Component Value Date    HIV1X2 Nonreactive 2024       Assessment:  Tunde Nix is a 53 y.o. 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 It also revealed patient is homozygous for paris-null promoter which has been shown to cause leukopenia/neutropenia in certain populations. With myeloid malignancy panel, flow cytometry, and ZULEMA negative the paris-null homozygosity is most likely the primary cause for patient's leukopenia. MPN/MPD still possibility however given that patient also asymptomatic, low suspicion. Patient's thrombocytopenia is most likely ITP given negative hematologic workup. 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 Pt understood and gave appropriate teach back about the plan of care. All questions were answered to the patient's satisfaction. The patient is instructed to contact us at any time if questions or problems arise. Thank you for the opportunity to participate in the care of this very pleasant patient.    Total time = 30 minutes. 50% or more of this time was spent in counseling and/or coordination of care including reviewing medical history/radiology/labs, examining patient, formulating outlined plan with team, and discussing plan with patient/family.    Sanju Raines PA-C

## 2024-08-14 ENCOUNTER — APPOINTMENT (OUTPATIENT)
Dept: PRIMARY CARE | Facility: CLINIC | Age: 54
End: 2024-08-14
Payer: COMMERCIAL

## 2024-11-08 ENCOUNTER — APPOINTMENT (OUTPATIENT)
Dept: PRIMARY CARE | Facility: CLINIC | Age: 54
End: 2024-11-08
Payer: COMMERCIAL

## 2024-11-08 VITALS
SYSTOLIC BLOOD PRESSURE: 121 MMHG | BODY MASS INDEX: 26.69 KG/M2 | OXYGEN SATURATION: 98 % | HEART RATE: 61 BPM | WEIGHT: 208 LBS | HEIGHT: 74 IN | DIASTOLIC BLOOD PRESSURE: 78 MMHG

## 2024-11-08 DIAGNOSIS — H93.A3 PULSATILE TINNITUS OF BOTH EARS: Primary | ICD-10-CM

## 2024-11-08 DIAGNOSIS — R09.89 LEFT CAROTID BRUIT: ICD-10-CM

## 2024-11-08 DIAGNOSIS — Z12.11 COLON CANCER SCREENING: ICD-10-CM

## 2024-11-08 DIAGNOSIS — L29.9 EAR ITCHING: ICD-10-CM

## 2024-11-08 PROCEDURE — 99213 OFFICE O/P EST LOW 20 MIN: CPT | Performed by: INTERNAL MEDICINE

## 2024-11-08 PROCEDURE — 3008F BODY MASS INDEX DOCD: CPT | Performed by: INTERNAL MEDICINE

## 2024-11-08 PROCEDURE — 1036F TOBACCO NON-USER: CPT | Performed by: INTERNAL MEDICINE

## 2024-11-08 RX ORDER — LANOLIN ALCOHOL/MO/W.PET/CERES
500 CREAM (GRAM) TOPICAL DAILY
COMMUNITY

## 2024-11-08 RX ORDER — FLUOCINOLONE ACETONIDE 0.11 MG/ML
5 OIL AURICULAR (OTIC) 2 TIMES DAILY
Qty: 20 ML | Refills: 1 | Status: SHIPPED | OUTPATIENT
Start: 2024-11-08

## 2024-11-08 ASSESSMENT — ENCOUNTER SYMPTOMS
LOSS OF SENSATION IN FEET: 0
OCCASIONAL FEELINGS OF UNSTEADINESS: 0
DEPRESSION: 0

## 2024-11-08 ASSESSMENT — PATIENT HEALTH QUESTIONNAIRE - PHQ9
SUM OF ALL RESPONSES TO PHQ9 QUESTIONS 1 AND 2: 0
1. LITTLE INTEREST OR PLEASURE IN DOING THINGS: NOT AT ALL
2. FEELING DOWN, DEPRESSED OR HOPELESS: NOT AT ALL

## 2024-11-08 NOTE — PROGRESS NOTES
"Chief Complaint:   Follow-up     History Of Present Illness:    Tunde Nix is a 53 y.o. male with active medical problems outlined below who presents to review labs and discuss pulsatile tinnitus.     Initial visit 5/10/24  Gets palpitations - concerned about heart.  Uncomfortable feeling in chest - usually better when exercising regularly.  Not exertional.  Feeling \"weak\".  Not having chest pain.  Am erections are rigid , not able to maintain rigid erection with partner.  Sildenafil works ,nl TT 501ng/dl in 2023.  (Tadalafil caused back pain)  Would like to update STI screening   Insomnia over time, using OTC melatonin - interrupted sleep, wakes after 4 hours   Energy and motivation have been down.    No change in bowel or bladder function.  Urine stream weaker over time.   Has hx of low WBC and low platelet.  Never met with hematology as recommended in past.  His brother passed from \"blood cancer\"    Asymptomatic but open to STI screening.    Stress Echo 7/11/17  Baseline Echo: The resting baseline ejection fraction was estimated at 60%.  Stress Echo: The ejection fraction is approximately 75% at peak stress.  Summary:   1. The resting ejection fraction was estimated at 60% with a peak exercise ejection fraction estimated at 75%.   2. No clinical, electrocardiographic or echocardiographic evidence for ischemia at a maximal workload.   3. The adequate level of stress was achieved.      INTERVAL HISTORY 11/8/24  Doing some dumbells and push ups at home - energy level OK, no exertional chest pain or shortness of breath.   Tunde has leukopenia and thrombocytopenia. Evaluated by heme and low WBC most consistent with  homozygous Paris-Null promotor.   Low plt likely ITP  Heme recommended follow up in 6mo.   Having pulsatile tinnitus has bene present for a while but increasing.  Occ headaches, no hearing loss, no vision changes.    Weight down 6-7 pounds          Patient Active Problem List   Diagnosis    Elevated " "bilirubin    Thrombocytopenia (CMS-HCC)    Chronic leukopenia    Proteinuria    Benign prostatic hyperplasia with weak urinary stream    Colon cancer screening    Other fatigue    Atypical mole    Erectile dysfunction    Premature ejaculation    Pearly penile papules    Male infertility    Insomnia    Hyperlipidemia    Heart palpitations    Exposure to chemical inhalation    Esophageal reflux    Decreased sexual desire    Cough    Candidal balanitis    Atypical chest pain    Acute renal insufficiency    Abdominal pain    Rectal pain    Left carotid bruit    Pulsatile tinnitus of both ears    Ear itching       Current Outpatient Medications:     ascorbic acid (Vitamin C) 500 mg ER capsule, Take 1 capsule (500 mg) by mouth once daily., Disp: , Rfl:     ergocalciferol (Vitamin D-2) 1.25 MG (58961 UT) capsule, Take 1 capsule (1,250 mcg) by mouth., Disp: , Rfl:     fluocinolone (DermOtic) 0.01 % ear drops, Administer 5 drops into each ear 2 times a day., Disp: 20 mL, Rfl: 1    sildenafil (Viagra) 100 mg tablet, Take 1 tablet (100 mg) by mouth once daily as needed for erectile dysfunction., Disp: 30 tablet, Rfl: 11    TURMERIC ORAL, Take by mouth., Disp: , Rfl:   Tadalafil  Social History     Tobacco Use    Smoking status: Never    Smokeless tobacco: Never   Vaping Use    Vaping status: Never Used   Substance Use Topics    Alcohol use: Never    Drug use: Never         All pertinent aspects of medical and surgical history were reviewed and updated in chart    Review of Systems   Pertinent positives in review of systems outlined above.  Complete ROS otherwise negative.        Last Recorded Vitals:  Patient Vitals for the past 24 hrs:   BP Pulse SpO2 Height Weight   11/08/24 0948 121/78 61 98 % 1.88 m (6' 2\") 94.3 kg (208 lb)          Physical Exam  HENT:      Mouth/Throat:      Pharynx: Oropharynx is clear.   Eyes:      Extraocular Movements: Extraocular movements intact.      Conjunctiva/sclera: Conjunctivae normal.      " Pupils: Pupils are equal, round, and reactive to light.   Neck:      Comments: Very soft left carotid bruit   Cardiovascular:      Pulses: Normal pulses.      Heart sounds: Normal heart sounds.   Pulmonary:      Effort: Pulmonary effort is normal.      Breath sounds: Normal breath sounds.   Musculoskeletal:      Right lower leg: No edema.      Left lower leg: No edema.   Lymphadenopathy:      Cervical: No cervical adenopathy.   Neurological:      General: No focal deficit present.             The 10-year ASCVD risk score (Kadeem DK, et al., 2019) is: 5.4%    Values used to calculate the score:      Age: 53 years      Sex: Male      Is Non- : Yes      Diabetic: No      Tobacco smoker: No      Systolic Blood Pressure: 121 mmHg      Is BP treated: No      HDL Cholesterol: 44.8 mg/dL      Total Cholesterol: 194 mg/dL      Assessment/Plan   Problem List Items Addressed This Visit       Colon cancer screening     Discussed options.  [facility] elects to do cologuard.          Relevant Orders    Cologuard® colon cancer screening    Left carotid bruit     Will arrange carotid doppler.          Relevant Orders    US carotid doppler left    Pulsatile tinnitus of both ears - Primary     Having bilateral symptoms. Will start with doppler US and consider MRA          Relevant Orders    US carotid doppler right    Ear itching     Refilled dermotic drops          Relevant Medications    fluocinolone (DermOtic) 0.01 % ear drops       A total of 20 minutes was spent reviewing the chart and recent testing and discussing plan of care.         Eduard Castelan MD

## 2024-11-13 ENCOUNTER — HOSPITAL ENCOUNTER (OUTPATIENT)
Dept: RADIOLOGY | Facility: CLINIC | Age: 54
Discharge: HOME | End: 2024-11-13
Payer: COMMERCIAL

## 2024-11-13 ENCOUNTER — APPOINTMENT (OUTPATIENT)
Dept: RADIOLOGY | Facility: CLINIC | Age: 54
End: 2024-11-13
Payer: COMMERCIAL

## 2024-11-13 DIAGNOSIS — H93.A3 PULSATILE TINNITUS OF BOTH EARS: ICD-10-CM

## 2024-11-13 PROCEDURE — 93880 EXTRACRANIAL BILAT STUDY: CPT | Performed by: RADIOLOGY

## 2024-11-13 PROCEDURE — 93880 EXTRACRANIAL BILAT STUDY: CPT

## 2024-11-16 ENCOUNTER — PATIENT MESSAGE (OUTPATIENT)
Dept: PRIMARY CARE | Facility: CLINIC | Age: 54
End: 2024-11-16
Payer: COMMERCIAL

## 2024-11-16 DIAGNOSIS — H93.A3 PULSATILE TINNITUS OF BOTH EARS: Primary | ICD-10-CM

## 2024-11-19 ENCOUNTER — PATIENT MESSAGE (OUTPATIENT)
Dept: CARE COORDINATION | Facility: CLINIC | Age: 54
End: 2024-11-19
Payer: COMMERCIAL

## 2025-01-20 ENCOUNTER — APPOINTMENT (OUTPATIENT)
Dept: HEMATOLOGY/ONCOLOGY | Facility: CLINIC | Age: 55
End: 2025-01-20
Payer: COMMERCIAL

## 2025-02-18 PROBLEM — K21.9 ESOPHAGEAL REFLUX: Status: RESOLVED | Noted: 2024-06-24 | Resolved: 2025-02-18

## 2025-02-18 PROBLEM — Z12.11 COLON CANCER SCREENING: Status: RESOLVED | Noted: 2024-05-10 | Resolved: 2025-02-18

## 2025-02-18 PROBLEM — B37.42 CANDIDAL BALANITIS: Status: RESOLVED | Noted: 2024-06-24 | Resolved: 2025-02-18

## 2025-02-18 PROBLEM — Z77.098 EXPOSURE TO CHEMICAL INHALATION: Status: RESOLVED | Noted: 2024-06-24 | Resolved: 2025-02-18

## 2025-02-18 PROBLEM — E78.5 HYPERLIPIDEMIA: Status: RESOLVED | Noted: 2024-06-24 | Resolved: 2025-02-18

## 2025-02-18 PROBLEM — R05.9 COUGH: Status: RESOLVED | Noted: 2024-06-24 | Resolved: 2025-02-18

## 2025-02-18 PROBLEM — R39.12 BENIGN PROSTATIC HYPERPLASIA WITH WEAK URINARY STREAM: Status: RESOLVED | Noted: 2024-05-10 | Resolved: 2025-02-18

## 2025-02-18 PROBLEM — N46.9 MALE INFERTILITY: Status: RESOLVED | Noted: 2024-06-24 | Resolved: 2025-02-18

## 2025-02-18 PROBLEM — N52.9 ERECTILE DYSFUNCTION: Status: RESOLVED | Noted: 2024-05-10 | Resolved: 2025-02-18

## 2025-02-18 PROBLEM — G47.00 INSOMNIA: Status: RESOLVED | Noted: 2024-06-24 | Resolved: 2025-02-18

## 2025-02-18 PROBLEM — R07.89 ATYPICAL CHEST PAIN: Status: RESOLVED | Noted: 2024-06-24 | Resolved: 2025-02-18

## 2025-02-18 PROBLEM — R17 ELEVATED BILIRUBIN: Status: RESOLVED | Noted: 2024-05-10 | Resolved: 2025-02-18

## 2025-02-18 PROBLEM — H60.543 DERMATITIS OF EAR CANAL, BILATERAL: Status: ACTIVE | Noted: 2024-11-08

## 2025-02-18 PROBLEM — N40.1 BENIGN PROSTATIC HYPERPLASIA WITH WEAK URINARY STREAM: Status: RESOLVED | Noted: 2024-05-10 | Resolved: 2025-02-18

## 2025-02-18 PROBLEM — R09.89 LEFT CAROTID BRUIT: Status: RESOLVED | Noted: 2024-11-08 | Resolved: 2025-02-18

## 2025-02-18 PROBLEM — F52.4 PREMATURE EJACULATION: Status: RESOLVED | Noted: 2024-06-24 | Resolved: 2025-02-18

## 2025-02-18 PROBLEM — R53.83 OTHER FATIGUE: Status: RESOLVED | Noted: 2024-05-10 | Resolved: 2025-02-18

## 2025-02-18 PROBLEM — N48.89 PEARLY PENILE PAPULES: Status: RESOLVED | Noted: 2024-06-24 | Resolved: 2025-02-18

## 2025-02-18 PROBLEM — F52.0 DECREASED SEXUAL DESIRE: Status: RESOLVED | Noted: 2024-06-24 | Resolved: 2025-02-18

## 2025-02-18 PROBLEM — R10.9 ABDOMINAL PAIN: Status: RESOLVED | Noted: 2024-06-24 | Resolved: 2025-02-18

## 2025-02-18 PROBLEM — D22.9 ATYPICAL MOLE: Status: RESOLVED | Noted: 2024-05-10 | Resolved: 2025-02-18

## 2025-02-18 PROBLEM — R00.2 HEART PALPITATIONS: Status: RESOLVED | Noted: 2024-06-24 | Resolved: 2025-02-18

## 2025-02-18 PROBLEM — N28.9 ACUTE RENAL INSUFFICIENCY: Status: RESOLVED | Noted: 2024-06-24 | Resolved: 2025-02-18

## 2025-02-18 PROBLEM — K62.89 RECTAL PAIN: Status: RESOLVED | Noted: 2024-06-24 | Resolved: 2025-02-18

## 2025-02-18 NOTE — ASSESSMENT & PLAN NOTE
-Carotid artery duplex ultrasound did not show significant obstruction.  -Patient was referred to ENT by previous PCP.  -See assessment and plan for palpitation.  -Will continue to monitor.

## 2025-02-18 NOTE — ASSESSMENT & PLAN NOTE
-Tunde has leukopenia and thrombocytopenia.    -Leukopenia  is likely 2/2 collins-null homozygosity  -Thrombocytopenia likely due to ITP.  -Levels have been stable.  -Will continue to monitor CBC and medication list.

## 2025-02-18 NOTE — PROGRESS NOTES
Subjective     Patient ID: Tunde Nix is a 54 y.o. male who presents today for establishment of care and other issues as listed below.    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      Initial PCP history 2/19/2025:  This is the first time I am meeting the patient.  Patient has a complaint of ongoing palpitations.  He is concerned that he could have a heart problem, specifically Plavix.  He had a cardiac stress test in 2017 which was normal.  He feels a sensation occasionally where his heart skips a beat.  He also feels pulsatile motion in his ears. Patient denies chest pain/pressure/tightness, shortness of breath, orthopnea, paroxysmal nocturnal dyspnea, lower limb edema, blurred vision, lightheadedness/dizziness, presyncope, syncope.      Tobacco/Alcohol/Drug Use:   Social History     Tobacco Use    Smoking status: Never    Smokeless tobacco: Never   Substance Use Topics    Alcohol use: Never       Required Screenings/Immunizations:   Health Maintenance Due   Topic Date Due    Colorectal Cancer Screening  Never done    MMR Vaccines (1 of 1 - Standard series) Never done    Hepatitis B Vaccines (1 of 3 - 19+ 3-dose series) Never done    DTaP/Tdap/Td Vaccines (1 - Tdap) Never done    Pneumococcal Vaccine (1 of 1 - PCV) Never done    Zoster Vaccines (1 of 2) Never done    Diabetes Screening  12/13/2023    Influenza Vaccine (1) Never done    COVID-19 Vaccine (4 - 2024-25 season) 09/01/2024       Problems to be addressed today in addition to Preventative Services: As stated in orders.     Review of Systems   Constitutional:  Negative for chills, fever and unexpected weight change.   HENT:  Positive for tinnitus (Pulsitile-Like blood flow.). Negative for rhinorrhea.    Eyes:  Negative for pain.   Respiratory:  Negative for chest tightness and shortness of breath.    Cardiovascular:  Positive for palpitations. Negative for chest pain.   Gastrointestinal:  Negative  "for abdominal pain.   Genitourinary:  Negative for hematuria.   Skin:  Negative for rash.   Neurological:  Negative for dizziness, syncope and light-headedness.   Psychiatric/Behavioral:  Negative for behavioral problems and suicidal ideas.        /78   Pulse 84   Temp 36.3 °C (97.4 °F)   Ht 1.88 m (6' 2\")   Wt 98.9 kg (218 lb)   SpO2 98%   BMI 27.99 kg/m²      Objective   Physical Exam  Vitals reviewed.   Constitutional:       General: He is not in acute distress.  HENT:      Head: Normocephalic.      Right Ear: External ear normal.      Left Ear: External ear normal.      Nose: No rhinorrhea.      Mouth/Throat:      Mouth: Mucous membranes are moist.   Eyes:      Conjunctiva/sclera: Conjunctivae normal.   Cardiovascular:      Rate and Rhythm: Normal rate and regular rhythm.      Heart sounds: No murmur heard.     No friction rub. No gallop.   Pulmonary:      Effort: No respiratory distress.      Breath sounds: No wheezing, rhonchi or rales.   Abdominal:      General: Bowel sounds are normal. There is no distension.      Palpations: Abdomen is soft.      Tenderness: There is no abdominal tenderness.   Musculoskeletal:      Cervical back: Neck supple.      Right lower leg: No edema.      Left lower leg: No edema.   Skin:     General: Skin is warm and dry.      Capillary Refill: Capillary refill takes less than 2 seconds.   Neurological:      Mental Status: He is alert.      Gait: Gait normal.           Labs:   Lab Results   Component Value Date    WBC 2.8 (L) 07/01/2024    HGB 14.9 07/01/2024    HCT 43.7 07/01/2024     (L) 07/01/2024    TSH 1.56 07/01/2024    PSA 0.70 05/24/2024    INR 1.0 07/01/2024     Lab Results   Component Value Date     05/24/2024    K 4.1 05/24/2024     05/24/2024    BUN 13 05/24/2024    CREATININE 1.35 (H) 05/24/2024    GLUCOSE 103 (H) 05/24/2024    CALCIUM 9.5 05/24/2024    PROT 7.6 05/24/2024    PROT 8.0 05/24/2024    BILITOT 1.1 05/24/2024    ALKPHOS 53 " "05/24/2024    AST 16 05/24/2024    ALT 18 05/24/2024     Lab Results   Component Value Date    CHOL 194 05/24/2024    CHOL 199 11/04/2020      Lab Results   Component Value Date    TRIG 91 05/24/2024    TRIG 79 11/04/2020      Lab Results   Component Value Date    HDL 44.8 05/24/2024    HDL 45.3 11/04/2020     Lab Results   Component Value Date    LDLCALC 131 (H) 05/24/2024      Lab Results   Component Value Date    VLDL 18 05/24/2024    VLDL 16 11/04/2020    No components found for: \"CHOLHDLRATI0\"    Imaging/Testing: Vascular US carotid artery duplex bilateral  Narrative: Interpreted By:  Chemo Nogueira,   STUDY:  [facility] US CAROTID ARTERY DUPLEX BILATERAL;  11/13/2024 10:41 am      INDICATION:  Signs/Symptoms:Pulsatile tinnitus.      ,H93.A3 Pulsatile tinnitus, bilateral      COMPARISON:  None.      ACCESSION NUMBER(S):  [id number]      ORDERING CLINICIAN:  SENIA BOLANOS      TECHNIQUE:  Vascular ultrasound of the extracranial carotid system was performed  bilaterally.  Gray scale, color Doppler and spectral Doppler waveform  analysis was performed.      FINDINGS:  RIGHT:  On the right  There is no evidence of atheromatous plaques in the  common carotid arteries, visualized portions of the internal carotid  arteries, and the proximal external carotid arteries. Doppler studies  demonstrate normal flow pattern without evidence of turbulent flow..  The peak systolic velocities are as follows:      RIGHT SIDE PEAK SYSTOLIC VELOCITY TABLE:  CCA 91.7.  ICA 81.0.  ECA 55.6.      The ratio of the peak systolic velocity of the right ICA/CCA is 0.88.      RIGHT VERTEBRAL ARTERY:  The right vertebral artery demonstrates proximal normal anterograde  flow      LEFT:  On the left  There is no evidence of atheromatous plaques in the  common carotid arteries, visualized portions of the internal carotid  arteries, and the proximal external carotid arteries. Doppler studies  demonstrate normal flow pattern without evidence of turbulent " flow..  The peak systolic velocities are as follows:      LEFT SIDE PEAK SYSTOLIC VELOCITY TABLE:  CCA 92.5,26.7.  ICA 76.1.  ECA 88.1.      The ratio of the peak systolic velocity of the left ICA/CCA is 0.82.      LEFT VERTEBRAL ARTERY:  The left vertebral artery demonstrates proximal normal anterograde  flow      Impression: Normal flow pattern without evidence of hemodynamically relevant  stenosis or atheromatous plaques in the visualized portions of the  carotid circulation as described above.      The velocity criteria are extrapolated from diameter data as defined  by the Society of Radiologists in Ultrasound Consensus Conference  Radiology 2003; 229;340-346.      MACRO:  None      Signed by: Chemo Nogueira 11/14/2024 6:27 AM  Dictation workstation:   [id number]      Problem List Items Addressed This Visit          Medium    Thrombocytopenia (CMS-HCC) - Primary    Current Assessment & Plan     -[place] has leukopenia and thrombocytopenia.    -Leukopenia  is likely 2/2 collins-null homozygosity  -Thrombocytopenia likely due to ITP.  -Levels have been stable.  -Will continue to monitor CBC and medication list.          Chronic leukopenia    Current Assessment & Plan     -[place] has leukopenia and thrombocytopenia.    -Leukopenia  is likely 2/2 collins-null homozygosity  -Thrombocytopenia likely due to ITP.  -Levels have been stable.  -Will continue to monitor CBC and medication list.          Pulsatile tinnitus of both ears    Current Assessment & Plan     -Carotid artery duplex ultrasound did not show significant obstruction.  -Patient was referred to ENT by previous PCP.  -See assessment and plan for palpitation.  -Will continue to monitor.         Palpitations    Current Assessment & Plan     -I reviewed outside hematology notes/laboratory regarding patient's blood count.  Hemoglobin levels are within normal limits.  No signs of anemia.  TSH was 4 with normal limits.  -Will obtain EKG, Holter monitor.  -Will  review results with patient and treatment options in May 2025.          Relevant Orders    ECG 12 Lead    Holter or Event Cardiac Monitor     Other Visit Diagnoses       Bilateral impacted cerumen        Relevant Medications    carbamide peroxide (Debrox) 6.5 % otic solution    High risk of cardiac event        Relevant Orders    CT cardiac scoring wo IV contrast    Screening for malignant neoplasm of colon        Relevant Orders    Colonoscopy Screening; Average Risk Patient              As part of today's Preventative Visit, an age and gender-appropriate history and physical was performed, as documented below. Counseling and anticipatory guidance were performed, and risk factor reduction interventions (including [place] Preventative Services Task Force recommended screening tests) were utilized/ordered as outlined in the above Assessment and Plan. All patient medications were reviewed, and refilled if necessary.    Patient and I discussed diet/nutrition, lifestyle modifications, safety, medication indications and side effects, and health goals.    current treatment plan is effective, no change in therapy, orders and follow up as documented in EMR, lab results reviewed with patient, repeat labs ordered prior to next appointment, reviewed compliance with lifestyle measures, reviewed diet, exercise and weight control, reviewed medications and side effects in detail     Follow-up in May 2025 for CPE, get blood work for CPE, follow-up on palpitations and review Holter monitor, hide EKG, CT cardiac coronary score with patient.  Will also follow-up on your itchiness.  Consider mometasone drops.      I have reviewed OARRS report, consistent with prescribed medication. I have considered risks of abuse, diversion, side effects and feel clinically benefit of medication outweighs risks at this time.          I spent 30 minutes in duration for this visit today. This time consisted of chart review, obtaining history,  and/or performing the exam as documented above as well as documenting the clinical information for the encounter in the electronic record, discussing treatment options, plans, and/or goals with patient, family, and/or caregiver, refilling medications, updating the electronic record, ordering medicines, lab work, imaging, referrals, and/or procedures as documented above and communicating with other [facility] professionals.

## 2025-02-19 ENCOUNTER — PHARMACY VISIT (OUTPATIENT)
Dept: PHARMACY | Facility: CLINIC | Age: 55
End: 2025-02-19
Payer: COMMERCIAL

## 2025-02-19 ENCOUNTER — APPOINTMENT (OUTPATIENT)
Dept: PRIMARY CARE | Facility: CLINIC | Age: 55
End: 2025-02-19
Payer: COMMERCIAL

## 2025-02-19 VITALS
TEMPERATURE: 97.4 F | SYSTOLIC BLOOD PRESSURE: 118 MMHG | HEIGHT: 74 IN | HEART RATE: 84 BPM | OXYGEN SATURATION: 98 % | BODY MASS INDEX: 27.98 KG/M2 | WEIGHT: 218 LBS | DIASTOLIC BLOOD PRESSURE: 78 MMHG

## 2025-02-19 DIAGNOSIS — H93.A3 PULSATILE TINNITUS OF BOTH EARS: ICD-10-CM

## 2025-02-19 DIAGNOSIS — H61.23 BILATERAL IMPACTED CERUMEN: ICD-10-CM

## 2025-02-19 DIAGNOSIS — Z91.89 HIGH RISK OF CARDIAC EVENT: ICD-10-CM

## 2025-02-19 DIAGNOSIS — D69.6 THROMBOCYTOPENIA (CMS-HCC): Primary | ICD-10-CM

## 2025-02-19 DIAGNOSIS — Z12.11 SCREENING FOR MALIGNANT NEOPLASM OF COLON: ICD-10-CM

## 2025-02-19 DIAGNOSIS — R00.2 PALPITATIONS: ICD-10-CM

## 2025-02-19 DIAGNOSIS — D72.819 CHRONIC LEUKOPENIA: ICD-10-CM

## 2025-02-19 PROCEDURE — 3008F BODY MASS INDEX DOCD: CPT | Performed by: STUDENT IN AN ORGANIZED HEALTH CARE EDUCATION/TRAINING PROGRAM

## 2025-02-19 PROCEDURE — 99214 OFFICE O/P EST MOD 30 MIN: CPT | Performed by: STUDENT IN AN ORGANIZED HEALTH CARE EDUCATION/TRAINING PROGRAM

## 2025-02-19 PROCEDURE — RXMED WILLOW AMBULATORY MEDICATION CHARGE

## 2025-02-19 RX ORDER — ASPIRIN 81 MG
5 TABLET, DELAYED RELEASE (ENTERIC COATED) ORAL 2 TIMES DAILY
Qty: 15 ML | Refills: 0 | Status: SHIPPED | OUTPATIENT
Start: 2025-02-19 | End: 2025-03-21

## 2025-02-19 ASSESSMENT — PATIENT HEALTH QUESTIONNAIRE - PHQ9
SUM OF ALL RESPONSES TO PHQ9 QUESTIONS 1 AND 2: 0
2. FEELING DOWN, DEPRESSED OR HOPELESS: NOT AT ALL
1. LITTLE INTEREST OR PLEASURE IN DOING THINGS: NOT AT ALL

## 2025-02-19 ASSESSMENT — COLUMBIA-SUICIDE SEVERITY RATING SCALE - C-SSRS
2. HAVE YOU ACTUALLY HAD ANY THOUGHTS OF KILLING YOURSELF?: NO
6. HAVE YOU EVER DONE ANYTHING, STARTED TO DO ANYTHING, OR PREPARED TO DO ANYTHING TO END YOUR LIFE?: NO
1. IN THE PAST MONTH, HAVE YOU WISHED YOU WERE DEAD OR WISHED YOU COULD GO TO SLEEP AND NOT WAKE UP?: NO

## 2025-02-19 ASSESSMENT — ENCOUNTER SYMPTOMS
ABDOMINAL PAIN: 0
EYE PAIN: 0
UNEXPECTED WEIGHT CHANGE: 0
CHEST TIGHTNESS: 0
FEVER: 0
SHORTNESS OF BREATH: 0
DIZZINESS: 0
PALPITATIONS: 1
RHINORRHEA: 0
LIGHT-HEADEDNESS: 0
HEMATURIA: 0
CHILLS: 0

## 2025-02-19 NOTE — ASSESSMENT & PLAN NOTE
-Patient has recurrent symptoms when he does not use the DermOtic drops.  I suspect that this is related to cerumen impaction.  -Will continue DermOtic drops.  -Will prescribe Debrox drops.

## 2025-02-19 NOTE — ASSESSMENT & PLAN NOTE
-I reviewed outside hematology notes/laboratory regarding patient's blood count.  Hemoglobin levels are within normal limits.  No signs of anemia.  TSH was 4 with normal limits.  -Will obtain EKG, Holter monitor.  -Will review results with patient and treatment options in May 2025.

## 2025-03-13 ENCOUNTER — HOSPITAL ENCOUNTER (OUTPATIENT)
Dept: CARDIOLOGY | Facility: HOSPITAL | Age: 55
Discharge: HOME | End: 2025-03-13
Payer: COMMERCIAL

## 2025-03-13 DIAGNOSIS — R00.2 PALPITATIONS: ICD-10-CM

## 2025-03-13 LAB
ATRIAL RATE: 77 BPM
P AXIS: 64 DEGREES
P OFFSET: 204 MS
P ONSET: 143 MS
PR INTERVAL: 170 MS
Q ONSET: 228 MS
QRS COUNT: 13 BEATS
QRS DURATION: 82 MS
QT INTERVAL: 360 MS
QTC CALCULATION(BAZETT): 407 MS
QTC FREDERICIA: 391 MS
R AXIS: 17 DEGREES
T AXIS: 15 DEGREES
T OFFSET: 408 MS
VENTRICULAR RATE: 77 BPM

## 2025-03-13 PROCEDURE — 93005 ELECTROCARDIOGRAM TRACING: CPT

## 2025-04-02 ENCOUNTER — HOSPITAL ENCOUNTER (OUTPATIENT)
Dept: RADIOLOGY | Facility: CLINIC | Age: 55
Discharge: HOME | End: 2025-04-02
Payer: COMMERCIAL

## 2025-04-02 DIAGNOSIS — Z91.89 HIGH RISK OF CARDIAC EVENT: ICD-10-CM

## 2025-04-02 PROCEDURE — 75571 CT HRT W/O DYE W/CA TEST: CPT

## 2025-04-10 ENCOUNTER — APPOINTMENT (OUTPATIENT)
Dept: CARDIOLOGY | Facility: HOSPITAL | Age: 55
End: 2025-04-10
Payer: COMMERCIAL

## 2025-05-20 PROBLEM — R80.9 PROTEINURIA: Status: RESOLVED | Noted: 2024-05-10 | Resolved: 2025-05-20

## 2025-05-20 ASSESSMENT — ENCOUNTER SYMPTOMS
UNEXPECTED WEIGHT CHANGE: 0
PALPITATIONS: 1
SHORTNESS OF BREATH: 0
LIGHT-HEADEDNESS: 0
CHEST TIGHTNESS: 0
HEMATURIA: 0
FEVER: 0
ABDOMINAL PAIN: 0
RHINORRHEA: 0
CHILLS: 0
DIZZINESS: 0
EYE PAIN: 0

## 2025-05-20 NOTE — PROGRESS NOTES
Subjective     Patient ID: Tunde Nix is a 54 y.o. male who presents today for establishment of care and other issues as listed below.    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      Initial PCP history 2/19/2025:  This is the first time I am meeting the patient.  Patient has a complaint of ongoing palpitations.  He is concerned that he could have a heart problem, specifically Plavix.  He had a cardiac stress test in 2017 which was normal.  He feels a sensation occasionally where his heart skips a beat.  He also feels pulsatile motion in his ears. Patient denies chest pain/pressure/tightness, shortness of breath, orthopnea, paroxysmal nocturnal dyspnea, lower limb edema, blurred vision, lightheadedness/dizziness, presyncope, syncope.    Interval History 5/21/2025:  Palpitations occur during the winter time. Then it goes away. Today is 5/21/2025, patient has not experienced palpitations in weeks. Therefore, he decided not to get against getting a holter monitor. Patient denies chest pain/pressure/tightness, shortness of breath, orthopnea, paroxysmal nocturnal dyspnea, lower limb edema, blurred vision, lightheadedness/dizziness, presyncope, syncope.        Tobacco/Alcohol/Drug Use:   Social History     Tobacco Use    Smoking status: Never    Smokeless tobacco: Never   Substance Use Topics    Alcohol use: Never       Required Screenings/Immunizations:   Health Maintenance Due   Topic Date Due    Colorectal Cancer Screening  Never done    MMR Vaccines (1 of 1 - Standard series) Never done    Hepatitis B Vaccines (1 of 3 - 19+ 3-dose series) Never done    DTaP/Tdap/Td Vaccines (1 - Tdap) Never done    Pneumococcal Vaccine (1 of 1 - PCV) Never done    Zoster Vaccines (1 of 2) Never done    COVID-19 Vaccine (4 - 2024-25 season) 09/01/2024       Problems to be addressed today in addition to Preventative Services: As stated in orders.     Review of Systems  "  Constitutional:  Negative for chills, fever and unexpected weight change.   HENT:  Positive for tinnitus (Pulsitile-Like blood flow.). Negative for rhinorrhea.    Eyes:  Negative for pain.   Respiratory:  Negative for chest tightness and shortness of breath.    Cardiovascular:  Positive for palpitations. Negative for chest pain.   Gastrointestinal:  Negative for abdominal pain.   Genitourinary:  Negative for hematuria.   Skin:  Negative for rash.   Neurological:  Negative for dizziness, syncope and light-headedness.   Psychiatric/Behavioral:  Negative for behavioral problems and suicidal ideas.        /80 (BP Location: Right arm, Patient Position: Sitting, BP Cuff Size: Large adult)   Pulse 83   Temp 36.3 °C (97.4 °F) (Temporal)   Ht 1.88 m (6' 2\")   Wt 101 kg (222 lb)   SpO2 96%   BMI 28.50 kg/m²      Objective   Physical Exam  Vitals reviewed.   Constitutional:       General: He is not in acute distress.  HENT:      Head: Normocephalic.      Right Ear: External ear normal.      Left Ear: External ear normal.      Nose: No rhinorrhea.      Mouth/Throat:      Mouth: Mucous membranes are moist.   Eyes:      Conjunctiva/sclera: Conjunctivae normal.   Cardiovascular:      Rate and Rhythm: Normal rate and regular rhythm.      Heart sounds: No murmur heard.     No friction rub. No gallop.   Pulmonary:      Effort: No respiratory distress.      Breath sounds: No wheezing, rhonchi or rales.   Abdominal:      General: Bowel sounds are normal. There is no distension.      Palpations: Abdomen is soft.      Tenderness: There is no abdominal tenderness.   Musculoskeletal:      Cervical back: Neck supple.      Right lower leg: No edema.      Left lower leg: No edema.   Skin:     General: Skin is warm and dry.      Capillary Refill: Capillary refill takes less than 2 seconds.   Neurological:      Mental Status: He is alert.      Gait: Gait normal.           Labs:   Lab Results   Component Value Date    WBC 3.3 (L) " "05/21/2025    HGB 14.8 05/21/2025    HCT 45.4 05/21/2025     05/21/2025    TSH 1.56 07/01/2024    PSA 1.92 05/21/2025    INR 1.0 07/01/2024     Lab Results   Component Value Date     05/21/2025    K 4.3 05/21/2025     05/21/2025    BUN 14 05/21/2025    CREATININE 1.32 (H) 05/21/2025    GLUCOSE 183 (H) 05/21/2025    CALCIUM 9.8 05/21/2025    PROT 7.8 05/21/2025    BILITOT 1.1 05/21/2025    ALKPHOS 56 05/21/2025    AST 29 05/21/2025    ALT 30 05/21/2025     Lab Results   Component Value Date    CHOL 224 (H) 05/21/2025    CHOL 194 05/24/2024    CHOL 199 11/04/2020      Lab Results   Component Value Date    TRIG 161 (H) 05/21/2025    TRIG 91 05/24/2024    TRIG 79 11/04/2020      Lab Results   Component Value Date    HDL 44 05/21/2025    HDL 44.8 05/24/2024    HDL 45.3 11/04/2020     Lab Results   Component Value Date    LDLCALC 150 (H) 05/21/2025    LDLCALC 131 (H) 05/24/2024      Lab Results   Component Value Date    VLDL 18 05/24/2024    VLDL 16 11/04/2020    No components found for: \"CHOLHDLRATI0\"    Imaging/Testing: CT cardiac scoring wo IV contrast  Narrative: Interpreted By:  [place] Mejia,   STUDY:  CT CARDIAC SCORING WO IV CONTRAST; 4/2/2025 6:10 pm      INDICATION:  Signs/Symptoms:Ongoing Palpitations.      COMPARISON:  None.      ACCESSION NUMBER(S):  [id number]      ORDERING CLINICIAN:  HARRIET DAUGHERTY      TECHNIQUE:  Using prospective ECG gating, CT scan of the coronary arteries was  performed without intravenous contrast. Coronary calcium scoring  was  performed according to the method of Agatston.      FINDINGS:  The score and distribution of calcium in the coronary arteries is as  follows:      LM 0,  LAD 19.07,  LCx 0,  RCA 78.07,      Total 97.14      The visualized ascending thoracic aorta measures 3.0 cm in diameter.  The heart is normal in size. No pericardial effusion is present.      No gross evidence of mediastinal or hilar lymphadenopathy or masses  is identified. The " visualized segments of the lungs are normally  expanded.      The main pulmonary artery, right and left pulmonary artery are normal  in size.      The visualized subdiaphragmatic structures appear intact.      Impression: 1. Coronary artery calcium score of 97.14*.      *Coronary Artery  Agatston score      Score  risk  Very low 1-99  Mildly increased 100-299  Moderately increased >300  Moderate to severely increased >800      Galilea et al. JCCT 2016 (http://dx.doi.org/10.1016/j.jcct.2016.11.003)      [place] 10-Year CHD Risk with Coronary Artery Calcification can be  calcuate using link below  [email or web address]  Breonna lew al. JACC 2015 (http://dx.doi.org/10.1016/j.j  acc.2015.08.035)      Signed by: [place] Mejia 4/4/2025 8:39 AM  Dictation workstation:   [id number]      Problem List Items Addressed This Visit          Medium    Thrombocytopenia    Current Assessment & Plan   -[place] has leukopenia and thrombocytopenia.    -Leukopenia  is likely 2/2 collins-null homozygosity  -Thrombocytopenia likely due to ITP.  -Levels have been stable.  -Will continue to monitor CBC and medication list.          Chronic leukopenia    Current Assessment & Plan   -[place] has leukopenia and thrombocytopenia.    -Leukopenia  is likely 2/2 collins-null homozygosity  -Thrombocytopenia likely due to ITP.  -Levels have been stable.  -Will continue to monitor CBC and medication list.          Pulsatile tinnitus of both ears    Current Assessment & Plan   -Carotid artery duplex ultrasound did not show significant obstruction.  -Patient was referred to ENT by previous PCP.  -See assessment and plan for palpitation.  -Will continue to monitor.         Dermatitis of ear canal, bilateral    Palpitations    Current Assessment & Plan   -I reviewed outside hematology notes/laboratory regarding patient's blood count.  Hemoglobin levels are within normal limits.  No signs of anemia.  TSH was 4 with  normal limits.  -Palpitations occur during the winter time. Then it goes away. As of 5/21/2025, patient has not experienced palpitations in weeks. Therefore, he decided not to get against getting a holter monitor.          Dyslipidemia    Current Assessment & Plan   -LDL Goal < 70.  -5/21/2025: Started Crestor 10mg on 5/21/2025.  -Will continue to monitor lipid panel.          Relevant Medications    rosuvastatin (Crestor) 10 mg tablet    Other Relevant Orders    Lipid panel (Completed)    Lipid panel     Other Visit Diagnoses         Routine general medical examination at a health care facility    -  Primary    Relevant Orders    Comprehensive Metabolic Panel (Completed)    CBC and Auto Differential (Completed)    Lipid panel (Completed)      Encounter for medication monitoring        Relevant Orders    Comprehensive Metabolic Panel (Completed)    CBC and Auto Differential (Completed)      Encounter for prostate cancer screening        Relevant Orders    Prostate Specific Antigen, Screen (Completed)      Hyperglycemia        Relevant Orders    Hemoglobin A1c (Completed)    Hemoglobin A1c      Chronic kidney disease, unspecified CKD stage        Relevant Orders    US renal complete              As part of today's Preventative Visit, an age and gender-appropriate history and physical was performed, as documented below. Counseling and anticipatory guidance were performed, and risk factor reduction interventions (including [place] Preventative Services Task Force recommended screening tests) were utilized/ordered as outlined in the above Assessment and Plan. All patient medications were reviewed, and refilled if necessary.    Patient and I discussed diet/nutrition, lifestyle modifications, safety, medication indications and side effects, and health goals.    current treatment plan is effective, no change in therapy, orders and follow up as documented in EMR, lab results reviewed with patient, repeat labs ordered  prior to next appointment, reviewed compliance with lifestyle measures, reviewed diet, exercise and weight control, reviewed medications and side effects in detail     Follow-up in May 2025 for CPE, get blood work for CPE, follow-up on palpitations and review Holter monitor, hide EKG, CT cardiac coronary score with patient.  Will also follow-up on your itchiness.  Consider mometasone drops.      I have reviewed OARRS report, consistent with prescribed medication. I have considered risks of abuse, diversion, side effects and feel clinically benefit of medication outweighs risks at this time.          I spent 30 minutes in duration for this visit today. This time consisted of chart review, obtaining history, and/or performing the exam as documented above as well as documenting the clinical information for the encounter in the electronic record, discussing treatment options, plans, and/or goals with patient, family, and/or caregiver, refilling medications, updating the electronic record, ordering medicines, lab work, imaging, referrals, and/or procedures as documented above and communicating with other [facility] professionals.

## 2025-05-20 NOTE — ASSESSMENT & PLAN NOTE
-I reviewed outside hematology notes/laboratory regarding patient's blood count.  Hemoglobin levels are within normal limits.  No signs of anemia.  TSH was 4 with normal limits.  -Palpitations occur during the winter time. Then it goes away. As of 5/21/2025, patient has not experienced palpitations in weeks. Therefore, he decided not to get against getting a holter monitor.

## 2025-05-21 ENCOUNTER — APPOINTMENT (OUTPATIENT)
Dept: PRIMARY CARE | Facility: CLINIC | Age: 55
End: 2025-05-21
Payer: COMMERCIAL

## 2025-05-21 VITALS
WEIGHT: 222 LBS | HEIGHT: 74 IN | OXYGEN SATURATION: 96 % | BODY MASS INDEX: 28.49 KG/M2 | TEMPERATURE: 97.4 F | SYSTOLIC BLOOD PRESSURE: 117 MMHG | HEART RATE: 83 BPM | DIASTOLIC BLOOD PRESSURE: 80 MMHG

## 2025-05-21 DIAGNOSIS — Z00.00 ROUTINE GENERAL MEDICAL EXAMINATION AT A HEALTH CARE FACILITY: Primary | ICD-10-CM

## 2025-05-21 DIAGNOSIS — Z51.81 ENCOUNTER FOR MEDICATION MONITORING: ICD-10-CM

## 2025-05-21 DIAGNOSIS — N18.9 CHRONIC KIDNEY DISEASE, UNSPECIFIED CKD STAGE: ICD-10-CM

## 2025-05-21 DIAGNOSIS — R73.9 HYPERGLYCEMIA: ICD-10-CM

## 2025-05-21 DIAGNOSIS — H93.A3 PULSATILE TINNITUS OF BOTH EARS: ICD-10-CM

## 2025-05-21 DIAGNOSIS — E78.5 DYSLIPIDEMIA: ICD-10-CM

## 2025-05-21 DIAGNOSIS — Z12.5 ENCOUNTER FOR PROSTATE CANCER SCREENING: ICD-10-CM

## 2025-05-21 DIAGNOSIS — R00.2 PALPITATIONS: ICD-10-CM

## 2025-05-21 DIAGNOSIS — H60.543 DERMATITIS OF EAR CANAL, BILATERAL: ICD-10-CM

## 2025-05-21 DIAGNOSIS — D72.819 CHRONIC LEUKOPENIA: ICD-10-CM

## 2025-05-21 DIAGNOSIS — D69.6 THROMBOCYTOPENIA: ICD-10-CM

## 2025-05-21 PROCEDURE — 99214 OFFICE O/P EST MOD 30 MIN: CPT | Performed by: STUDENT IN AN ORGANIZED HEALTH CARE EDUCATION/TRAINING PROGRAM

## 2025-05-21 PROCEDURE — 99396 PREV VISIT EST AGE 40-64: CPT | Performed by: STUDENT IN AN ORGANIZED HEALTH CARE EDUCATION/TRAINING PROGRAM

## 2025-05-21 PROCEDURE — 3008F BODY MASS INDEX DOCD: CPT | Performed by: STUDENT IN AN ORGANIZED HEALTH CARE EDUCATION/TRAINING PROGRAM

## 2025-05-21 PROCEDURE — RXMED WILLOW AMBULATORY MEDICATION CHARGE

## 2025-05-21 PROCEDURE — 1036F TOBACCO NON-USER: CPT | Performed by: STUDENT IN AN ORGANIZED HEALTH CARE EDUCATION/TRAINING PROGRAM

## 2025-05-21 RX ORDER — ROSUVASTATIN CALCIUM 10 MG/1
10 TABLET, COATED ORAL DAILY
Qty: 100 TABLET | Refills: 3 | Status: SHIPPED | OUTPATIENT
Start: 2025-05-21 | End: 2026-06-25

## 2025-05-21 ASSESSMENT — ENCOUNTER SYMPTOMS
DEPRESSION: 0
OCCASIONAL FEELINGS OF UNSTEADINESS: 0
LOSS OF SENSATION IN FEET: 0

## 2025-05-21 NOTE — ASSESSMENT & PLAN NOTE
-LDL Goal < 70.  -5/21/2025: Started Crestor 10mg on 5/21/2025.  -Will continue to monitor lipid panel.

## 2025-05-22 ENCOUNTER — PHARMACY VISIT (OUTPATIENT)
Dept: PHARMACY | Facility: CLINIC | Age: 55
End: 2025-05-22
Payer: COMMERCIAL

## 2025-05-22 LAB
ALBUMIN SERPL-MCNC: 4.9 G/DL (ref 3.6–5.1)
ALP SERPL-CCNC: 56 U/L (ref 35–144)
ALT SERPL-CCNC: 30 U/L (ref 9–46)
ANION GAP SERPL CALCULATED.4IONS-SCNC: 11 MMOL/L (CALC) (ref 7–17)
AST SERPL-CCNC: 29 U/L (ref 10–35)
BASOPHILS # BLD AUTO: 40 CELLS/UL (ref 0–200)
BASOPHILS NFR BLD AUTO: 1.2 %
BILIRUB SERPL-MCNC: 1.1 MG/DL (ref 0.2–1.2)
BUN SERPL-MCNC: 14 MG/DL (ref 7–25)
CALCIUM SERPL-MCNC: 9.8 MG/DL (ref 8.6–10.3)
CHLORIDE SERPL-SCNC: 101 MMOL/L (ref 98–110)
CHOLEST SERPL-MCNC: 224 MG/DL
CHOLEST/HDLC SERPL: 5.1 (CALC)
CO2 SERPL-SCNC: 27 MMOL/L (ref 20–32)
CREAT SERPL-MCNC: 1.32 MG/DL (ref 0.7–1.3)
EGFRCR SERPLBLD CKD-EPI 2021: 64 ML/MIN/1.73M2
EOSINOPHIL # BLD AUTO: 191 CELLS/UL (ref 15–500)
EOSINOPHIL NFR BLD AUTO: 5.8 %
ERYTHROCYTE [DISTWIDTH] IN BLOOD BY AUTOMATED COUNT: 13.9 % (ref 11–15)
EST. AVERAGE GLUCOSE BLD GHB EST-MCNC: 189 MG/DL
EST. AVERAGE GLUCOSE BLD GHB EST-SCNC: 10.4 MMOL/L
GLUCOSE SERPL-MCNC: 183 MG/DL (ref 65–99)
HBA1C MFR BLD: 8.2 %
HCT VFR BLD AUTO: 45.4 % (ref 38.5–50)
HDLC SERPL-MCNC: 44 MG/DL
HGB BLD-MCNC: 14.8 G/DL (ref 13.2–17.1)
LDLC SERPL CALC-MCNC: 150 MG/DL (CALC)
LYMPHOCYTES # BLD AUTO: 1238 CELLS/UL (ref 850–3900)
LYMPHOCYTES NFR BLD AUTO: 37.5 %
MCH RBC QN AUTO: 26 PG (ref 27–33)
MCHC RBC AUTO-ENTMCNC: 32.6 G/DL (ref 32–36)
MCV RBC AUTO: 79.6 FL (ref 80–100)
MONOCYTES # BLD AUTO: 300 CELLS/UL (ref 200–950)
MONOCYTES NFR BLD AUTO: 9.1 %
NEUTROPHILS # BLD AUTO: 1531 CELLS/UL (ref 1500–7800)
NEUTROPHILS NFR BLD AUTO: 46.4 %
NONHDLC SERPL-MCNC: 180 MG/DL (CALC)
PLATELET # BLD AUTO: 140 THOUSAND/UL (ref 140–400)
PMV BLD REES-ECKER: 9.5 FL (ref 7.5–12.5)
POTASSIUM SERPL-SCNC: 4.3 MMOL/L (ref 3.5–5.3)
PROT SERPL-MCNC: 7.8 G/DL (ref 6.1–8.1)
PSA SERPL-MCNC: 1.92 NG/ML
RBC # BLD AUTO: 5.7 MILLION/UL (ref 4.2–5.8)
SODIUM SERPL-SCNC: 139 MMOL/L (ref 135–146)
TRIGL SERPL-MCNC: 161 MG/DL
WBC # BLD AUTO: 3.3 THOUSAND/UL (ref 3.8–10.8)

## 2025-06-05 ENCOUNTER — APPOINTMENT (OUTPATIENT)
Dept: RADIOLOGY | Facility: HOSPITAL | Age: 55
End: 2025-06-05
Payer: COMMERCIAL

## 2025-06-05 DIAGNOSIS — N18.9 CHRONIC KIDNEY DISEASE, UNSPECIFIED CKD STAGE: ICD-10-CM

## 2025-06-05 PROCEDURE — 76770 US EXAM ABDO BACK WALL COMP: CPT

## 2025-08-22 DIAGNOSIS — E78.5 DYSLIPIDEMIA: ICD-10-CM

## 2025-08-22 DIAGNOSIS — R73.9 HYPERGLYCEMIA: ICD-10-CM

## 2025-08-29 LAB
CHOLEST SERPL-MCNC: 171 MG/DL
CHOLEST/HDLC SERPL: 3.7 (CALC)
EST. AVERAGE GLUCOSE BLD GHB EST-MCNC: 128 MG/DL
EST. AVERAGE GLUCOSE BLD GHB EST-SCNC: 7.1 MMOL/L
HBA1C MFR BLD: 6.1 %
HDLC SERPL-MCNC: 46 MG/DL
LDLC SERPL CALC-MCNC: 108 MG/DL (CALC)
NONHDLC SERPL-MCNC: 125 MG/DL (CALC)
TRIGL SERPL-MCNC: 77 MG/DL

## 2025-11-12 ENCOUNTER — APPOINTMENT (OUTPATIENT)
Dept: PRIMARY CARE | Facility: CLINIC | Age: 55
End: 2025-11-12
Payer: COMMERCIAL

## 2025-11-12 DIAGNOSIS — E78.5 DYSLIPIDEMIA: Primary | ICD-10-CM

## 2025-11-12 DIAGNOSIS — Z51.81 ENCOUNTER FOR MEDICATION MONITORING: ICD-10-CM
